# Patient Record
Sex: FEMALE | Race: WHITE | NOT HISPANIC OR LATINO | Employment: PART TIME | URBAN - METROPOLITAN AREA
[De-identification: names, ages, dates, MRNs, and addresses within clinical notes are randomized per-mention and may not be internally consistent; named-entity substitution may affect disease eponyms.]

---

## 2017-03-31 ENCOUNTER — HOSPITAL ENCOUNTER (EMERGENCY)
Facility: HOSPITAL | Age: 27
Discharge: HOME | End: 2017-03-31
Attending: EMERGENCY MEDICINE | Admitting: EMERGENCY MEDICINE
Payer: COMMERCIAL

## 2017-03-31 VITALS
SYSTOLIC BLOOD PRESSURE: 123 MMHG | WEIGHT: 156.53 LBS | TEMPERATURE: 98 F | OXYGEN SATURATION: 97 % | HEART RATE: 114 BPM | RESPIRATION RATE: 16 BRPM | DIASTOLIC BLOOD PRESSURE: 64 MMHG

## 2017-03-31 DIAGNOSIS — R11.2 NAUSEA AND VOMITING, INTRACTABILITY OF VOMITING NOT SPECIFIED, UNSPECIFIED VOMITING TYPE: Primary | ICD-10-CM

## 2017-03-31 PROCEDURE — 96374 THER/PROPH/DIAG INJ IV PUSH: CPT

## 2017-03-31 PROCEDURE — 96375 TX/PRO/DX INJ NEW DRUG ADDON: CPT

## 2017-03-31 PROCEDURE — 96361 HYDRATE IV INFUSION ADD-ON: CPT

## 2017-03-31 PROCEDURE — 99283 EMERGENCY DEPT VISIT LOW MDM: CPT

## 2017-03-31 RX ORDER — METOCLOPRAMIDE HYDROCHLORIDE 5 MG/ML
10 INJECTION INTRAMUSCULAR; INTRAVENOUS ONCE
Status: COMPLETED | OUTPATIENT
Start: 2017-03-31 | End: 2017-03-31

## 2017-03-31 RX ORDER — DIPHENHYDRAMINE HYDROCHLORIDE 50 MG/ML
25 INJECTION INTRAMUSCULAR; INTRAVENOUS EVERY 6 HOURS PRN
Status: DISCONTINUED | OUTPATIENT
Start: 2017-03-31 | End: 2017-03-31 | Stop reason: HOSPADM

## 2017-03-31 RX ORDER — ONDANSETRON 4 MG/1
8 TABLET, ORALLY DISINTEGRATING ORAL EVERY 8 HOURS PRN
Qty: 10 TABLET | Refills: 0 | Status: SHIPPED | OUTPATIENT
Start: 2017-03-31

## 2017-03-31 RX ADMIN — SODIUM CHLORIDE 1000 ML: 0.9 INJECTION, SOLUTION INTRAVENOUS at 09:42

## 2017-03-31 RX ADMIN — METOCLOPRAMIDE 10 MG: 5 INJECTION, SOLUTION INTRAMUSCULAR; INTRAVENOUS at 09:42

## 2017-03-31 RX ADMIN — DIPHENHYDRAMINE HYDROCHLORIDE: 50 INJECTION, SOLUTION INTRAMUSCULAR; INTRAVENOUS at 09:42

## 2018-05-30 ENCOUNTER — HOSPITAL ENCOUNTER (EMERGENCY)
Facility: HOSPITAL | Age: 28
Discharge: HOME | End: 2018-05-30
Attending: EMERGENCY MEDICINE | Admitting: EMERGENCY MEDICINE
Payer: COMMERCIAL

## 2018-05-30 VITALS
RESPIRATION RATE: 18 BRPM | SYSTOLIC BLOOD PRESSURE: 138 MMHG | HEART RATE: 106 BPM | DIASTOLIC BLOOD PRESSURE: 96 MMHG | HEIGHT: 62 IN | TEMPERATURE: 98.2 F | WEIGHT: 179.4 LBS | OXYGEN SATURATION: 95 % | BODY MASS INDEX: 33.01 KG/M2

## 2018-05-30 DIAGNOSIS — K08.89 PAIN, DENTAL: Primary | ICD-10-CM

## 2018-05-30 PROCEDURE — 99282 EMERGENCY DEPT VISIT SF MDM: CPT

## 2018-05-30 RX ORDER — CLINDAMYCIN HYDROCHLORIDE 150 MG/1
300 CAPSULE ORAL EVERY 6 HOURS
Qty: 56 CAPSULE | Refills: 0 | Status: SHIPPED | OUTPATIENT
Start: 2018-05-30 | End: 2018-06-06

## 2018-05-30 RX ORDER — OXYCODONE HYDROCHLORIDE AND ACETAMINOPHEN 5; 325 MG/1; MG/1
1 TABLET ORAL EVERY 4 HOURS PRN
Qty: 28 TABLET | Refills: 0 | Status: SHIPPED | OUTPATIENT
Start: 2018-05-30 | End: 2018-06-02

## 2018-05-30 RX ORDER — OXYCODONE HYDROCHLORIDE AND ACETAMINOPHEN 5; 325 MG/1; MG/1
1 TABLET ORAL ONCE
Status: COMPLETED | OUTPATIENT
Start: 2018-05-30 | End: 2018-05-30

## 2018-05-30 RX ORDER — CLINDAMYCIN HYDROCHLORIDE 150 MG/1
300 CAPSULE ORAL ONCE
Status: COMPLETED | OUTPATIENT
Start: 2018-05-30 | End: 2018-05-30

## 2018-05-30 RX ADMIN — CLINDAMYCIN HYDROCHLORIDE 300 MG: 150 CAPSULE ORAL at 22:52

## 2018-05-30 RX ADMIN — OXYCODONE HYDROCHLORIDE AND ACETAMINOPHEN 1 TABLET: 5; 325 TABLET ORAL at 22:52

## 2018-05-31 NOTE — ED PROVIDER NOTES
History  Chief Complaint   Patient presents with    Dental Pain     Cavities causing tooth pain  She reports throwing up from the pain  Has not made an appointment because of insurance issues  Patient presents to the emergency department with chronic tick dental pain but with flare for the last 3 or 4 days  Her greatest location is in the right upper teeth  She denies fever chills or rash  Denies facial swelling  Denies sore throat or swallowing difficulty  She has been having trouble getting into a dentist secondary to the type of insurance she has  She denies trauma  Prior to Admission Medications   Prescriptions Last Dose Informant Patient Reported? Taking?   ondansetron (ZOFRAN ODT) 4 mg disintegrating tablet   No No   Sig: Take 2 tablets by mouth every 8 (eight) hours as needed for nausea or vomiting for up to 10 doses      Facility-Administered Medications: None       Past Medical History:   Diagnosis Date    GERD (gastroesophageal reflux disease)        Past Surgical History:   Procedure Laterality Date    TOOTH EXTRACTION         History reviewed  No pertinent family history  I have reviewed and agree with the history as documented  Social History   Substance Use Topics    Smoking status: Current Every Day Smoker     Types: Cigarettes    Smokeless tobacco: Never Used    Alcohol use No        Review of Systems   Constitutional: Negative  Negative for activity change, appetite change, chills, diaphoresis, fatigue and fever  HENT: Positive for dental problem  Negative for facial swelling  Eyes: Negative  Respiratory: Negative  Cardiovascular: Negative  Gastrointestinal: Negative  Endocrine: Negative  Genitourinary: Negative  Musculoskeletal: Negative  Negative for neck pain and neck stiffness  Skin: Negative  Allergic/Immunologic: Negative  Neurological: Negative  Negative for dizziness, light-headedness and headaches  Hematological: Negative  Does not bruise/bleed easily  Psychiatric/Behavioral: Negative  Physical Exam  Physical Exam   Constitutional: She is oriented to person, place, and time  She appears well-developed and well-nourished  She appears distressed  Nontoxic without respiratory distress but visibly uncomfortable  No drooling or hot potato voice  Patient can answer simple questions appropriately  HENT:   Head: Normocephalic and atraumatic  Mouth/Throat: Oropharynx is clear and moist  No oropharyngeal exudate  No trismus or submandibular tenderness  No exudative tonsils  No uvular deviation  No drooling  Right upper molars tender to palpation with a tongue blade  No gingival swelling or foul odor   Eyes: EOM are normal  Pupils are equal, round, and reactive to light  Neck: Normal range of motion  Neck supple  Pulmonary/Chest: Effort normal and breath sounds normal  No respiratory distress  She has no wheezes  She has no rales  She exhibits no tenderness  Musculoskeletal: Normal range of motion  She exhibits no edema, tenderness or deformity  Neurological: She is alert and oriented to person, place, and time  She displays normal reflexes  No cranial nerve deficit or sensory deficit  She exhibits normal muscle tone  Coordination normal    Skin: Skin is warm and dry  No rash noted  She is not diaphoretic  Psychiatric: She has a normal mood and affect  Her behavior is normal  Judgment and thought content normal    Nursing note and vitals reviewed        Vital Signs  ED Triage Vitals [05/30/18 2133]   Temperature Pulse Respirations Blood Pressure SpO2   98 2 °F (36 8 °C) (!) 106 18 138/96 95 %      Temp Source Heart Rate Source Patient Position - Orthostatic VS BP Location FiO2 (%)   Oral Monitor Sitting Left arm --      Pain Score       Worst Possible Pain           Vitals:    05/30/18 2133   BP: 138/96   Pulse: (!) 106   Patient Position - Orthostatic VS: Sitting       Visual Acuity      ED Medications  Medications   clindamycin (CLEOCIN) capsule 300 mg (not administered)   oxyCODONE-acetaminophen (PERCOCET) 5-325 mg per tablet 1 tablet (not administered)       Diagnostic Studies  Results Reviewed     None                 No orders to display              Procedures  Procedures       Phone Contacts  ED Phone Contact    ED Course  ED Course as of May 30 2252   Wed May 30, 2018   2248 Patient is stable for discharge  Percocet clindamycin will order with referral to the dental clinic  I discussed signs and symptoms that would require the patient return to the emergency department  [facility]  CritCare Time    Disposition  Final diagnoses:   Pain, dental     Time reflects when diagnosis was documented in both MDM as applicable and the Disposition within this note     Time User Action Codes Description Comment    5/30/2018 10:48 PM Jordyn Sadler [K08 [id number]] Pain, dental       ED Disposition     ED Disposition Condition Comment    Discharge  Krystina Olvera discharge to home/self care      Condition at discharge: Stable        Follow-up Information     Follow up With Specialties Details Why Contact Info    Jenny Charles DMD [facility] Schedule an appointment as soon as possible for a visit  [facility] [id number]  [phone]            Patient's Medications   Discharge Prescriptions    CLINDAMYCIN (CLEOCIN) 150 MG CAPSULE    Take 2 capsules (300 mg total) by mouth every 6 (six) hours for 7 days       Start Date: 5/30/2018 End Date: 6/6/2018       Order Dose: 300 mg       Quantity: 56 capsule    Refills: 0    OXYCODONE-ACETAMINOPHEN (PERCOCET) 5-325 MG PER TABLET    Take 1 tablet by mouth every 4 (four) hours as needed for moderate pain for up to 3 days Max Daily Amount: 6 tablets       Start Date: 5/30/2018 End Date: 6/2/2018       Order Dose: 1 tablet       Quantity: 28 tablet    Refills: 0     No discharge procedures on file     ED Provider  Electronically Signed by           Libia Rich MD  05/30/18 0644

## 2018-05-31 NOTE — DISCHARGE INSTRUCTIONS
Toothache   WHAT YOU NEED TO KNOW:   A toothache is pain that is caused by irritation of the nerves in the center of your tooth  The irritation may be caused by several problems, such as a cavity, an infection, a cracked tooth, or gum disease  It is very important to follow up with your dentist so the cause of your toothache can be diagnosed and treated  This can help prevent more serious problems  DISCHARGE INSTRUCTIONS:   Medicines: You may  need any of the following:  · NSAIDs  decrease swelling and pain  This medicine can be bought with or without a doctor's order  This medicine can cause stomach bleeding or kidney problems in certain people  If you take blood thinner medicine, always ask your healthcare provider if NSAIDs are safe for you  Always read the medicine label and follow the directions on it before using this medicine  · Acetaminophen  decreases pain  It is available without a doctor's order  Ask how much to take and how often to take it  Follow directions  Acetaminophen can cause liver damage if not taken correctly  · Pain medicine  may be given as a pill or as medicine that you put directly on your tooth or gums  Do not wait until the pain is severe before you take this medicine  · Antibiotics  help fight or prevent an infection caused by bacteria  Take them as directed  · Take your medicine as directed  Contact your healthcare provider if you think your medicine is not helping or if you have side effects  Tell him of her if you are allergic to any medicine  Keep a list of the medicines, vitamins, and herbs you take  Include the amounts, and when and why you take them  Bring the list or the pill bottles to follow-up visits  Carry your medicine list with you in case of an emergency  Follow up with your dentist as directed: You may be referred to a dental surgeon  Write down your questions so you remember to ask them during your visits     Self-care:   · Rinse your mouth with warm salt water 4 times a day or as directed  · You may need to eat soft foods to help relieve pain caused by chewing  Contact your dentist if:   · You have questions or concerns about your condition or care  Return to the emergency department if:   · You have trouble breathing  · You have swelling in your face or neck  · You have a fever and chills  · You have trouble speaking or swallowing  · You have trouble opening or closing your mouth  © 2017 [facility] Information is for End User's use only and may not be sold, redistributed or otherwise used for commercial purposes  All illustrations and images included in CareNotes® are the copyrighted property of [employer] M [employer]  or Howard Roberts  The above information is an  only  It is not intended as medical advice for individual conditions or treatments  Talk to your doctor, nurse or pharmacist before following any medical regimen to see if it is safe and effective for you

## 2018-11-06 ENCOUNTER — HOSPITAL ENCOUNTER (EMERGENCY)
Facility: HOSPITAL | Age: 28
Discharge: HOME | End: 2018-11-06
Attending: EMERGENCY MEDICINE
Payer: COMMERCIAL

## 2018-11-06 VITALS
TEMPERATURE: 98.5 F | HEIGHT: 62 IN | WEIGHT: 170 LBS | BODY MASS INDEX: 31.28 KG/M2 | HEART RATE: 98 BPM | RESPIRATION RATE: 18 BRPM | DIASTOLIC BLOOD PRESSURE: 74 MMHG | OXYGEN SATURATION: 96 % | SYSTOLIC BLOOD PRESSURE: 138 MMHG

## 2018-11-06 DIAGNOSIS — L03.011 PARONYCHIA OF FINGER, RIGHT: Primary | ICD-10-CM

## 2018-11-06 DIAGNOSIS — T14.8XXA FOREIGN BODY IN SKIN: ICD-10-CM

## 2018-11-06 PROCEDURE — 99283 EMERGENCY DEPT VISIT LOW MDM: CPT

## 2018-11-06 RX ORDER — SULFAMETHOXAZOLE AND TRIMETHOPRIM 800; 160 MG/1; MG/1
1 TABLET ORAL 2 TIMES DAILY
Qty: 14 TABLET | Refills: 0 | Status: SHIPPED | OUTPATIENT
Start: 2018-11-06 | End: 2018-11-13

## 2018-11-06 RX ORDER — CEPHALEXIN 250 MG/1
500 CAPSULE ORAL ONCE
Status: COMPLETED | OUTPATIENT
Start: 2018-11-06 | End: 2018-11-06

## 2018-11-06 RX ORDER — IBUPROFEN 600 MG/1
600 TABLET ORAL EVERY 6 HOURS PRN
Qty: 20 TABLET | Refills: 0 | Status: SHIPPED | OUTPATIENT
Start: 2018-11-06

## 2018-11-06 RX ORDER — BACITRACIN, NEOMYCIN, POLYMYXIN B 400; 3.5; 5 [USP'U]/G; MG/G; [USP'U]/G
1 OINTMENT TOPICAL ONCE
Status: COMPLETED | OUTPATIENT
Start: 2018-11-06 | End: 2018-11-06

## 2018-11-06 RX ORDER — LIDOCAINE HYDROCHLORIDE 10 MG/ML
5 INJECTION, SOLUTION EPIDURAL; INFILTRATION; INTRACAUDAL; PERINEURAL ONCE
Status: COMPLETED | OUTPATIENT
Start: 2018-11-06 | End: 2018-11-06

## 2018-11-06 RX ORDER — SULFAMETHOXAZOLE AND TRIMETHOPRIM 800; 160 MG/1; MG/1
1 TABLET ORAL ONCE
Status: COMPLETED | OUTPATIENT
Start: 2018-11-06 | End: 2018-11-06

## 2018-11-06 RX ORDER — CEPHALEXIN 500 MG/1
500 CAPSULE ORAL 4 TIMES DAILY
Qty: 28 CAPSULE | Refills: 0 | Status: SHIPPED | OUTPATIENT
Start: 2018-11-06 | End: 2018-11-13

## 2018-11-06 RX ADMIN — BACITRACIN, NEOMYCIN, POLYMYXIN B 1 SMALL APPLICATION: 400; 3.5; 5 OINTMENT TOPICAL at 19:55

## 2018-11-06 RX ADMIN — LIDOCAINE HYDROCHLORIDE 5 ML: 10 INJECTION, SOLUTION EPIDURAL; INFILTRATION; INTRACAUDAL; PERINEURAL at 19:54

## 2018-11-06 RX ADMIN — SULFAMETHOXAZOLE AND TRIMETHOPRIM 1 TABLET: 800; 160 TABLET ORAL at 19:53

## 2018-11-06 RX ADMIN — CEPHALEXIN 500 MG: 250 CAPSULE ORAL at 19:53

## 2018-11-07 NOTE — DISCHARGE INSTRUCTIONS
Paronychia   WHAT YOU NEED TO KNOW:   Paronychia is an infection of your nail fold caused by bacteria or a fungus  The nail fold is the skin around your nail  Paronychia may happen suddenly and last for 6 weeks or longer  You may have paronychia on more than 1 finger or toe  DISCHARGE INSTRUCTIONS:   Medicines:   · Td vaccine  is a booster shot used to help prevent tetanus and diphtheria  The Td booster may be given to adolescents and adults every 10 years or for certain wounds and injuries  · Antibiotics: This medicine will help fight or prevent an infection  It may be given as a pill, cream, or ointment  · Steroids: This medicine will help decrease inflammation  It may be given as a pill, cream, or ointment  · Antifungal medicine: This medicine helps kill fungus that may be causing your infection  It may be given as a cream or ointment  · NSAIDs:  These medicines decrease pain and swelling  NSAIDs are available without a doctor's order  Ask your healthcare provider which medicine is right for you  Ask how much to take and when to take it  Take as directed  NSAIDs can cause stomach bleeding and kidney problems if not taken correctly  · Take your medicine as directed  Contact your healthcare provider if you think your medicine is not helping or if you have side effects  Tell him of her if you are allergic to any medicine  Keep a list of the medicines, vitamins, and herbs you take  Include the amounts, and when and why you take them  Bring the list or the pill bottles to follow-up visits  Carry your medicine list with you in case of an emergency  Follow up with your healthcare provider as directed:  Write down your questions so you remember to ask them during your visits  Self-care:   · Soak your nail:  Soak your nail in a mixture of equal parts vinegar and water 3 or 4 times each day  This will help decrease inflammation      · Apply a warm compress:  Soak a washcloth in warm water and place it on your nail  This will help decrease inflammation  · Elevate:  Raise your nail above the level of your heart as often as you can  This will help decrease swelling and pain  Prop your nail on pillows or blankets to keep it elevated comfortably  · Use lotion:  Apply lotion after you wash your hands  This will prevent your skin from becoming too dry  Prevent paronychia:   · Avoid chemicals and allergens that may harm your skin and nails  This includes soaps, laundry detergents, and nail products  · Keep your nails clean and dry  Avoid soaking your nails in water  Use cotton-lined rubber gloves or wear 2 rubber gloves if you work with food or water  The gloves will help protect your nail folds  · Keep your nails short  Do not bite your nails, pick at your hangnails, suck your fingers, or wear fake nails  Bring your own nail tools when you go to the nail salon  Contact your healthcare provider if:   · Your nail becomes loose, deformed, or falls off  · You have a large abscess on your nail  · You have questions or concerns about your condition or care  Return to the emergency department if:   · You have severe nail pain  · The inflammation spreads to your hand or arm  © 2017 [facility] Information is for End User's use only and may not be sold, redistributed or otherwise used for commercial purposes  All illustrations and images included in CareNotes® are the copyrighted property of [employer]  or Howard Roberts  The above information is an  only  It is not intended as medical advice for individual conditions or treatments  Talk to your doctor, nurse or pharmacist before following any medical regimen to see if it is safe and effective for you  Cellulitis, Ambulatory Care   GENERAL INFORMATION:   Cellulitis  is a skin infection caused by bacteria         Common symptoms include the following:   · Fever    · A red, warm, swollen area on your skin    · Pain when the area is touched    · Bumps or blisters (abscess) that may drain pus    · Bumpy, raised skin that feels like an orange peel  Seek immediate care for the following symptoms:   · An increase in pain, redness, warmth, and size    · Red streaks coming from the infected area    · A thin, gray-brown discharge coming from your infected skin area    · A crackling under your skin when you touch it    · Purple dots or bumps on your skin, or bleeding under your skin    · New swelling and pain in your legs    · Sudden trouble breathing or chest pain  Treatment for cellulitis  may include medicines to treat the bacterial infection or decrease pain  The infection may need to be cleaned out  Damaged, dead, or infected tissue may need to be cut away to help your wound heal   Manage your symptoms:   · Elevate your wound above the level of your heart  as often as you can  This will help decrease swelling and pain  Prop your wound on pillows or blankets to keep it elevated comfortably  · Clean your wound as directed  You may need to wash the wound with soap and water  Look for signs of infection  · Wear pressure stockings as directed  The stockings are tight and put pressure on your legs  This improves blood flow and decreases swelling  Prevent cellulitis:   · Wash your hands often  Use soap and water  Wash your hands after you use the bathroom, change a child's diapers, or sneeze  Wash your hands before you prepare or eat food  Use lotion to prevent dry, cracked skin  · Do not share personal items, such as towels, clothing, and razors  · Clean exercise equipment  with germ-killing  before and after you use it  Follow up with your healthcare provider as directed:  Write down your questions so you remember to ask them during your visits  CARE AGREEMENT:   You have the right to help plan your care  Learn about your health condition and how it may be treated   Discuss treatment options with your caregivers to decide what care you want to receive  You always have the right to refuse treatment  The above information is an  only  It is not intended as medical advice for individual conditions or treatments  Talk to your doctor, nurse or pharmacist before following any medical regimen to see if it is safe and effective for you  © 2014 [address] is for End User's use only and may not be sold, redistributed or otherwise used for commercial purposes  All illustrations and images included in CareNotes® are the copyrighted property of [employer] A M [employer] Inc  or Howard Roberts

## 2018-11-07 NOTE — ED PROVIDER NOTES
History  Chief Complaint   Patient presents with    Foreign Body in Skin     Patient c/o splinter in right middle finger for about two weeks  C/o R 3rd finger swelling/pain/redness by the edge of the nail over the past 2 weeks after she got a splinter in that area  She is not sure if there still is a foreign body/splinter there  No fevers, no n/v            Prior to Admission Medications   Prescriptions Last Dose Informant Patient Reported? Taking?   ondansetron (ZOFRAN ODT) 4 mg disintegrating tablet   No No   Sig: Take 2 tablets by mouth every 8 (eight) hours as needed for nausea or vomiting for up to 10 doses      Facility-Administered Medications: None       Past Medical History:   Diagnosis Date    GERD (gastroesophageal reflux disease)        Past Surgical History:   Procedure Laterality Date    TOOTH EXTRACTION         History reviewed  No pertinent family history  I have reviewed and agree with the history as documented  Social History   Substance Use Topics    Smoking status: Current Every Day Smoker     Types: Cigarettes    Smokeless tobacco: Current User    Alcohol use No        Review of Systems   Constitutional: Negative for fever  Respiratory: Negative for shortness of breath  Cardiovascular: Negative for chest pain  Gastrointestinal: Negative for abdominal pain  Skin: Positive for wound  Neurological: Negative for weakness  Physical Exam  Physical Exam   Constitutional: She is oriented to person, place, and time  She appears well-developed and well-nourished  HENT:   Head: Normocephalic and atraumatic  Pulmonary/Chest: Breath sounds normal    Musculoskeletal: Normal range of motion  Neurological: She is alert and oriented to person, place, and time  Skin:   R 3rd finger,lateral nail edge with swelling/redness/tenderness, good cap refill, +n/v intact, FROM   Nursing note and vitals reviewed        Vital Signs  ED Triage Vitals [11/06/18 1828]   Temperature Pulse Respirations Blood Pressure SpO2   98 5 °F (36 9 °C) 98 18 138/74 96 %      Temp Source Heart Rate Source Patient Position - Orthostatic VS BP Location FiO2 (%)   Oral Monitor Sitting Left arm --      Pain Score       --           Vitals:    11/06/18 1828   BP: 138/74   Pulse: 98   Patient Position - Orthostatic VS: Sitting       Visual Acuity      ED Medications  Medications   lidocaine (PF) (XYLOCAINE-MPF) 1 % injection 5 mL (5 mL Infiltration Given 11/6/18 1954)   cephalexin (KEFLEX) capsule 500 mg (500 mg Oral Given 11/6/18 1953)   sulfamethoxazole-trimethoprim (BACTRIM DS) 800-160 mg per tablet 1 tablet (1 tablet Oral Given 11/6/18 1953)   neomycin-bacitracin-polymyxin b (NEOSPORIN) ointment 1 small application (1 small application Topical Given 11/6/18 1955)       Diagnostic Studies  Results Reviewed     None                 No orders to display              Procedures  Incision/Drainage  Date/Time: 11/6/2018 7:35 PM  Performed by: TORI Winters  Authorized by: TORI Wniters     Patient location:  ED  Consent:     Consent obtained:  Verbal    Consent given by:  Patient    Risks discussed:  Bleeding, incomplete drainage, pain and infection    Alternatives discussed:  No treatment and alternative treatment  Universal protocol:     Patient identity confirmed:  Verbally with patient and arm band  Location:     Type:  Abscess (paronychia)    Size:  1cm    Location: R 3rd finger, lateral nail edge  Pre-procedure details:     Skin preparation:  Betadine  Anesthesia (see MAR for exact dosages):      Anesthesia method:  Local infiltration    Local anesthetic:  Lidocaine 1% w/o epi (2mL)  Procedure details:     Complexity:  Simple    Incision types:  Stab incision    Scalpel blade:  11    Approach:  Open    Incision depth:  Subcutaneous and skin    Wound management:  Probed and deloculated    Drainage:  Bloody    Drainage amount:  Scant    Wound treatment:  Wound left open    Packing materials:  None  Post-procedure details:     Patient tolerance of procedure: Tolerated well, no immediate complications  Comments:      Placed neosporin and dressing           Phone Contacts  ED Phone Contact    ED Course                               MDM  CritCare Time    Disposition  Final diagnoses:   Paronychia of finger, right   Foreign body in skin     Time reflects when diagnosis was documented in both MDM as applicable and the Disposition within this note     Time User Action Codes Description Comment    11/6/2018  7:41 PM Penny Capone R Add [L03 011] Paronychia of finger, right     11/6/2018  7:42 PM Penny Capone R Add [T14  8XXA] Foreign body in skin       ED Disposition     ED Disposition Condition Comment    Discharge  Bearl Neighbor discharge to home/self care      Condition at discharge: Stable        Follow-up Information     Follow up With Specialties Details Why Contact Info Additional [phone] Family Medicine   [address]  [facility] [id number]  [address], [address] [place], [place], [address]          Discharge Medication List as of 11/6/2018  7:44 PM      START taking these medications    Details   cephalexin (KEFLEX) 500 mg capsule Take 1 capsule (500 mg total) by mouth 4 (four) times a day for 7 days, Starting Tue 11/6/2018, Until Tue 11/13/2018, Print      ibuprofen (MOTRIN) 600 mg tablet Take 1 tablet (600 mg total) by mouth every 6 (six) hours as needed for mild pain, Starting Tue 11/6/2018, Print      sulfamethoxazole-trimethoprim (BACTRIM DS) 800-160 mg per tablet Take 1 tablet by mouth 2 (two) times a day for 7 days smx-tmp DS (BACTRIM) 800-160 mg tabs (1tab q12 D10), Starting Tue 11/6/2018, Until Tue 11/13/2018, Print         CONTINUE these medications which have NOT CHANGED    Details   ondansetron (ZOFRAN ODT) 4 mg disintegrating tablet Take 2 tablets by mouth every 8 (eight) hours as needed for nausea or vomiting for up to 10 doses, Starting 3/31/2017, Until Discontinued, Print           No discharge procedures on file      ED Provider  Electronically Signed by           Hugo Sharif MD  11/06/18 2832

## 2020-01-30 ENCOUNTER — HOSPITAL ENCOUNTER (EMERGENCY)
Facility: HOSPITAL | Age: 30
Discharge: HOME | End: 2020-01-30
Attending: EMERGENCY MEDICINE | Admitting: EMERGENCY MEDICINE
Payer: COMMERCIAL

## 2020-01-30 VITALS
DIASTOLIC BLOOD PRESSURE: 78 MMHG | RESPIRATION RATE: 18 BRPM | OXYGEN SATURATION: 97 % | WEIGHT: 160 LBS | BODY MASS INDEX: 29.26 KG/M2 | HEART RATE: 96 BPM | SYSTOLIC BLOOD PRESSURE: 136 MMHG | TEMPERATURE: 98.5 F

## 2020-01-30 DIAGNOSIS — L03.012 PARONYCHIA OF LEFT MIDDLE FINGER: Primary | ICD-10-CM

## 2020-01-30 PROCEDURE — 99283 EMERGENCY DEPT VISIT LOW MDM: CPT

## 2020-01-30 PROCEDURE — 99284 EMERGENCY DEPT VISIT MOD MDM: CPT | Performed by: PHYSICIAN ASSISTANT

## 2020-01-30 RX ORDER — CEPHALEXIN 500 MG/1
500 CAPSULE ORAL 4 TIMES DAILY
Qty: 40 CAPSULE | Refills: 0 | Status: SHIPPED | OUTPATIENT
Start: 2020-01-30 | End: 2020-02-09

## 2020-01-30 NOTE — ED PROVIDER NOTES
History  Chief Complaint   Patient presents with    Finger Injury     pt states "her finger is infected from having a hair splinter stuck in her finger"  66-year-old female presents the emergency department with complaints left-sided middle finger pain  States she has had some pain along the side of the nail for the past 2 weeks  No drainage from the area  Small amount of redness  States she has history of previous infection along the nail bed which required drainage  She does not bite her nails  Works as a   States that she did have a manicure 2 weeks ago but that this problem started prior to this  Has been soaking the finger in warm salt water without relief of symptoms  History provided by:  Patient   used: No        Prior to Admission Medications   Prescriptions Last Dose Informant Patient Reported? Taking?   ibuprofen (MOTRIN) 600 mg tablet   No No   Sig: Take 1 tablet (600 mg total) by mouth every 6 (six) hours as needed for mild pain   ondansetron (ZOFRAN ODT) 4 mg disintegrating tablet   No No   Sig: Take 2 tablets by mouth every 8 (eight) hours as needed for nausea or vomiting for up to 10 doses      Facility-Administered Medications: None       Past Medical History:   Diagnosis Date    GERD (gastroesophageal reflux disease)        Past Surgical History:   Procedure Laterality Date    TOOTH EXTRACTION         History reviewed  No pertinent family history  I have reviewed and agree with the history as documented  Social History     Tobacco Use    Smoking status: Current Every Day Smoker     Types: Cigarettes    Smokeless tobacco: Current User   Substance Use Topics    Alcohol use: No    Drug use: No        Review of Systems   Constitutional: Negative for chills and fever  HENT: Negative for congestion, dental problem and sore throat  Respiratory: Negative for cough  Cardiovascular: Negative for chest pain     Gastrointestinal: Negative for abdominal pain  Musculoskeletal: Negative for back pain and neck pain  Finger pain   Skin: Negative for rash and wound  All other systems reviewed and are negative  Physical Exam  Physical Exam   Constitutional: She is oriented to person, place, and time  Vital signs are normal  She appears well-developed and well-nourished  HENT:   Head: Normocephalic and atraumatic  Cardiovascular: Normal rate and regular rhythm  Pulmonary/Chest: Effort normal and breath sounds normal  No respiratory distress  She has no wheezes  She has no rhonchi  She has no rales  Musculoskeletal:        Hands:  Neurological: She is alert and oriented to person, place, and time  Skin: Skin is warm and dry  Psychiatric: She has a normal mood and affect  Her behavior is normal    Nursing note and vitals reviewed  Vital Signs  ED Triage Vitals [01/30/20 0708]   Temperature Pulse Respirations Blood Pressure SpO2   98 5 °F (36 9 °C) 96 18 136/78 97 %      Temp Source Heart Rate Source Patient Position - Orthostatic VS BP Location FiO2 (%)   Oral Monitor -- -- --      Pain Score       --           Vitals:    01/30/20 0708   BP: 136/78   Pulse: 96         Visual Acuity      ED Medications  Medications - No data to display    Diagnostic Studies  Results Reviewed     None                 No orders to display              Procedures  Procedures         ED Course                               MDM  Number of Diagnoses or Management Options  Paronychia of left middle finger:   Diagnosis management comments: Differential diagnosis includes but not limited to:  Paronychia      18 gauge needle used along the nail plate to aspirate small area of purulent material   Patient tolerated well          Disposition  Final diagnoses:   Paronychia of left middle finger     Time reflects when diagnosis was documented in both MDM as applicable and the Disposition within this note     Time User Action Codes Description Comment    1/30/2020 8:16 AM Ragini Quezada Add [A87 [id number]] Paronychia of left middle finger       ED Disposition     ED Disposition Condition Date/Time Comment    Discharge Stable Thu Jan 30, 2020  8:16 AM Truong Salazar discharge to home/self care  Follow-up Information    None         Patient's Medications   Discharge Prescriptions    CEPHALEXIN (KEFLEX) 500 MG CAPSULE    Take 1 capsule (500 mg total) by mouth 4 (four) times a day for 10 days       Start Date: 1/30/2020 End Date: 2/9/2020       Order Dose: 500 mg       Quantity: 40 capsule    Refills: 0     No discharge procedures on file      ED Provider  Electronically Signed by           Fredy German PA-C  01/30/20 7058

## 2021-02-10 ENCOUNTER — HOSPITAL ENCOUNTER (EMERGENCY)
Facility: HOSPITAL | Age: 31
Discharge: HOME | End: 2021-02-10
Attending: EMERGENCY MEDICINE | Admitting: EMERGENCY MEDICINE
Payer: COMMERCIAL

## 2021-02-10 ENCOUNTER — APPOINTMENT (EMERGENCY)
Dept: RADIOLOGY | Facility: HOSPITAL | Age: 31
End: 2021-02-10
Payer: COMMERCIAL

## 2021-02-10 VITALS
DIASTOLIC BLOOD PRESSURE: 67 MMHG | TEMPERATURE: 97.6 F | WEIGHT: 160 LBS | RESPIRATION RATE: 18 BRPM | OXYGEN SATURATION: 97 % | SYSTOLIC BLOOD PRESSURE: 137 MMHG | BODY MASS INDEX: 29.26 KG/M2 | HEART RATE: 60 BPM
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
## 2021-02-10 DIAGNOSIS — S80.01XA CONTUSION OF RIGHT KNEE, INITIAL ENCOUNTER: Primary | ICD-10-CM

## 2021-02-10 PROCEDURE — 73564 X-RAY EXAM KNEE 4 OR MORE: CPT

## 2021-02-10 PROCEDURE — 99283 EMERGENCY DEPT VISIT LOW MDM: CPT

## 2021-02-10 PROCEDURE — 99282 EMERGENCY DEPT VISIT SF MDM: CPT | Performed by: EMERGENCY MEDICINE

## 2021-02-10 NOTE — DISCHARGE INSTRUCTIONS
Diagnosis; fall/ right knee contusion     -  activity as tolerated     - for pain- over the counter tylenol 500 mg every 4 hrs     - if after 1 week - still having pain- decreased range of motion- please call   the orthopedic doctor to  schedule an appointment - you can see anyone in t he group

## 2021-02-12 NOTE — ED PROVIDER NOTES
History  Chief Complaint   Patient presents with    Knee Injury     reports slipping on a puddle on saturday injuring R knee  reports persistent pain and swelling to knee  26 yr female slipped on puddle in store over last weekend with direct trauma of right  ant/medial knee against floor- no rotational injury prior- c/o ant/ medal knee pain -- no other comps or injuries       History provided by:  Patient   used: No        Prior to Admission Medications   Prescriptions Last Dose Informant Patient Reported? Taking?   ibuprofen (MOTRIN) 600 mg tablet   No No   Sig: Take 1 tablet (600 mg total) by mouth every 6 (six) hours as needed for mild pain   ondansetron (ZOFRAN ODT) 4 mg disintegrating tablet   No No   Sig: Take 2 tablets by mouth every 8 (eight) hours as needed for nausea or vomiting for up to 10 doses      Facility-Administered Medications: None       Past Medical History:   Diagnosis Date    GERD (gastroesophageal reflux disease)        Past Surgical History:   Procedure Laterality Date    TOOTH EXTRACTION         History reviewed  No pertinent family history  I have reviewed and agree with the history as documented  E-Cigarette/Vaping    E-Cigarette Use Never User      E-Cigarette/Vaping Substances     Social History     Tobacco Use    Smoking status: Current Every Day Smoker     Packs/day: 0 50     Types: Cigarettes    Smokeless tobacco: Current User   Substance Use Topics    Alcohol use: No    Drug use: No       Review of Systems   Constitutional: Negative  HENT: Negative  Eyes: Negative  Respiratory: Negative  Cardiovascular: Negative  Gastrointestinal: Negative  Endocrine: Negative  Genitourinary: Negative  Musculoskeletal: Negative  R5 knee pain    Skin: Negative  Allergic/Immunologic: Negative  Neurological: Negative  Hematological: Negative  Psychiatric/Behavioral: Negative          Physical Exam  Physical Exam  Vitals signs and nursing note reviewed  Constitutional:       General: She is not in acute distress  Appearance: Normal appearance  She is not ill-appearing, toxic-appearing or diaphoretic  Comments: avss-- pulse ox  97 % on ra- interpretation is normal- no intervention    Musculoskeletal:         General: Tenderness and signs of injury present  No swelling or deformity  Right lower leg: No edema  Left lower leg: No edema  Comments: Knee- pos anterior/medial knee tenderness to palpation / active rom --  Pos patellar tenderness to palpation -- no fib head tenderness to palpation - no lig laxity / no effusion- normal flex/ext at knee- no calf tenderness to palpation - normal achilles tendon function    Neurological:      Mental Status: She is alert  Vital Signs  ED Triage Vitals   Temperature Pulse Respirations Blood Pressure SpO2   02/10/21 1445 02/10/21 1447 02/10/21 1447 02/10/21 1447 02/10/21 1447   97 6 °F (36 4 °C) 60 18 137/67 97 %      Temp src Heart Rate Source Patient Position - Orthostatic VS BP Location FiO2 (%)   -- -- -- -- --             Pain Score       02/10/21 1447       7           Vitals:    02/10/21 1447   BP: 137/67   Pulse: 60         Visual Acuity      ED Medications  Medications - No data to display    Diagnostic Studies  Results Reviewed     None                 XR knee 4+ vw right injury   ED Interpretation by Leonardo Ba MD (02/10 1547)   No fx       Final Result by Seng Harris MD (02/10 1601)      No acute osseous abnormality              Workstation performed: [id number]                    Procedures  Procedures         ED Course  ED Course as of Feb 12 0729   Wed Feb 10, 2021   1547 R knee x ray - no fx                                               MDM    Disposition  Final diagnoses:   Contusion of right knee, initial encounter   Fall, initial encounter     Time reflects when diagnosis was documented in both MDM as applicable and the Disposition within this note     Time User Action Codes Description Comment    2/10/2021  3:48 PM Erwin Sadler [S80 01XA] Contusion of right knee, initial encounter     2/10/2021  3:49 PM Erwin Sadler [E84  Darinel Padilla, initial encounter       ED Disposition     ED Disposition Condition Date/Time Comment    Discharge Stable Wed Feb 10, 2021  3:48 PM Felice Calderon discharge to home/self care  Follow-up Information     Follow up With Specialties Details Why Contact Info    Adolfo Gowers, MD Orthopedic Surgery Call  As needed [address]   [address] [facility] [address]  [phone]            Discharge Medication List as of 2/10/2021  3:51 PM      CONTINUE these medications which have NOT CHANGED    Details   ibuprofen (MOTRIN) 600 mg tablet Take 1 tablet (600 mg total) by mouth every 6 (six) hours as needed for mild pain, Starting Tue 11/6/2018, Print      ondansetron (ZOFRAN ODT) 4 mg disintegrating tablet Take 2 tablets by mouth every 8 (eight) hours as needed for nausea or vomiting for up to 10 doses, Starting 3/31/2017, Until Discontinued, Print           No discharge procedures on file      PDMP Review     None          ED Provider  Electronically Signed by           Toya Chaney MD  02/12/21 8931

## 2022-03-30 ENCOUNTER — HOSPITAL ENCOUNTER (EMERGENCY)
Facility: HOSPITAL | Age: 32
Discharge: HOME | End: 2022-03-30
Attending: EMERGENCY MEDICINE | Admitting: EMERGENCY MEDICINE
Payer: COMMERCIAL

## 2022-03-30 ENCOUNTER — APPOINTMENT (EMERGENCY)
Dept: CT IMAGING | Facility: HOSPITAL | Age: 32
End: 2022-03-30
Payer: COMMERCIAL

## 2022-03-30 ENCOUNTER — APPOINTMENT (EMERGENCY)
Dept: ULTRASOUND IMAGING | Facility: HOSPITAL | Age: 32
End: 2022-03-30
Payer: COMMERCIAL

## 2022-03-30 VITALS
DIASTOLIC BLOOD PRESSURE: 78 MMHG | SYSTOLIC BLOOD PRESSURE: 138 MMHG | HEIGHT: 62 IN | HEART RATE: 64 BPM | OXYGEN SATURATION: 97 % | TEMPERATURE: 97.9 F | BODY MASS INDEX: 29.26 KG/M2 | RESPIRATION RATE: 17 BRPM

## 2022-03-30 DIAGNOSIS — A59.9 TRICHOMONAS INFECTION: Primary | ICD-10-CM

## 2022-03-30 DIAGNOSIS — R11.14 BILIOUS VOMITING WITH NAUSEA: ICD-10-CM

## 2022-03-30 LAB
ALBUMIN SERPL BCP-MCNC: 4 G/DL (ref 3.5–5)
ALP SERPL-CCNC: 67 U/L (ref 46–116)
ALT SERPL W P-5'-P-CCNC: 35 U/L (ref 12–78)
ANION GAP SERPL CALCULATED.3IONS-SCNC: 8 MMOL/L (ref 4–13)
AST SERPL W P-5'-P-CCNC: 22 U/L (ref 5–45)
BACTERIA UR QL AUTO: ABNORMAL /HPF
BASOPHILS # BLD AUTO: 0.05 THOUSANDS/ΜL (ref 0–0.1)
BASOPHILS NFR BLD AUTO: 1 % (ref 0–1)
BILIRUB SERPL-MCNC: 0.3 MG/DL (ref 0.2–1)
BILIRUB UR QL STRIP: NEGATIVE
BUN SERPL-MCNC: 9 MG/DL (ref 5–25)
CALCIUM SERPL-MCNC: 8.7 MG/DL (ref 8.3–10.1)
CHLORIDE SERPL-SCNC: 104 MMOL/L (ref 100–108)
CLARITY UR: CLEAR
CO2 SERPL-SCNC: 24 MMOL/L (ref 21–32)
COLOR UR: YELLOW
CREAT SERPL-MCNC: 1 MG/DL (ref 0.6–1.3)
EOSINOPHIL # BLD AUTO: 0.04 THOUSAND/ΜL (ref 0–0.61)
EOSINOPHIL NFR BLD AUTO: 1 % (ref 0–6)
ERYTHROCYTE [DISTWIDTH] IN BLOOD BY AUTOMATED COUNT: 12.9 % (ref 11.6–15.1)
EXT PREG TEST URINE: NEGATIVE
EXT. CONTROL ED NAV: NORMAL
GFR SERPL CREATININE-BSD FRML MDRD: 73 ML/MIN/1.73SQ M
GLUCOSE SERPL-MCNC: 112 MG/DL (ref 65–140)
GLUCOSE UR STRIP-MCNC: NEGATIVE MG/DL
HCT VFR BLD AUTO: 40.9 % (ref 34.8–46.1)
HGB BLD-MCNC: 13.6 G/DL (ref 11.5–15.4)
HGB UR QL STRIP.AUTO: ABNORMAL
IMM GRANULOCYTES # BLD AUTO: 0.02 THOUSAND/UL (ref 0–0.2)
IMM GRANULOCYTES NFR BLD AUTO: 0 % (ref 0–2)
KETONES UR STRIP-MCNC: NEGATIVE MG/DL
LEUKOCYTE ESTERASE UR QL STRIP: ABNORMAL
LIPASE SERPL-CCNC: 50 U/L (ref 73–393)
LYMPHOCYTES # BLD AUTO: 1.52 THOUSANDS/ΜL (ref 0.6–4.47)
LYMPHOCYTES NFR BLD AUTO: 24 % (ref 14–44)
MCH RBC QN AUTO: 29.6 PG (ref 26.8–34.3)
MCHC RBC AUTO-ENTMCNC: 33.3 G/DL (ref 31.4–37.4)
MCV RBC AUTO: 89 FL (ref 82–98)
MONOCYTES # BLD AUTO: 0.22 THOUSAND/ΜL (ref 0.17–1.22)
MONOCYTES NFR BLD AUTO: 4 % (ref 4–12)
NEUTROPHILS # BLD AUTO: 4.46 THOUSANDS/ΜL (ref 1.85–7.62)
NEUTS SEG NFR BLD AUTO: 70 % (ref 43–75)
NITRITE UR QL STRIP: NEGATIVE
NON-SQ EPI CELLS URNS QL MICRO: ABNORMAL /HPF
NRBC BLD AUTO-RTO: 0 /100 WBCS
OTHER STN SPEC: ABNORMAL
PH UR STRIP.AUTO: 6.5 [PH] (ref 4.5–8)
PLATELET # BLD AUTO: 286 THOUSANDS/UL (ref 149–390)
PMV BLD AUTO: 9 FL (ref 8.9–12.7)
POTASSIUM SERPL-SCNC: 3.9 MMOL/L (ref 3.5–5.3)
PROT SERPL-MCNC: 7.7 G/DL (ref 6.4–8.2)
PROT UR STRIP-MCNC: NEGATIVE MG/DL
RBC # BLD AUTO: 4.59 MILLION/UL (ref 3.81–5.12)
RBC #/AREA URNS AUTO: ABNORMAL /HPF
SODIUM SERPL-SCNC: 136 MMOL/L (ref 136–145)
SP GR UR STRIP.AUTO: 1.01 (ref 1–1.03)
UROBILINOGEN UR QL STRIP.AUTO: 0.2 E.U./DL
WBC # BLD AUTO: 6.31 THOUSAND/UL (ref 4.31–10.16)
WBC #/AREA URNS AUTO: ABNORMAL /HPF

## 2022-03-30 PROCEDURE — G1004 CDSM NDSC: HCPCS

## 2022-03-30 PROCEDURE — 80053 COMPREHEN METABOLIC PANEL: CPT | Performed by: EMERGENCY MEDICINE

## 2022-03-30 PROCEDURE — 99284 EMERGENCY DEPT VISIT MOD MDM: CPT | Performed by: EMERGENCY MEDICINE

## 2022-03-30 PROCEDURE — 74177 CT ABD & PELVIS W/CONTRAST: CPT

## 2022-03-30 PROCEDURE — 85025 COMPLETE CBC W/AUTO DIFF WBC: CPT | Performed by: EMERGENCY MEDICINE

## 2022-03-30 PROCEDURE — 99284 EMERGENCY DEPT VISIT MOD MDM: CPT

## 2022-03-30 PROCEDURE — 76705 ECHO EXAM OF ABDOMEN: CPT

## 2022-03-30 PROCEDURE — 81001 URINALYSIS AUTO W/SCOPE: CPT

## 2022-03-30 PROCEDURE — 81025 URINE PREGNANCY TEST: CPT

## 2022-03-30 PROCEDURE — 36415 COLL VENOUS BLD VENIPUNCTURE: CPT

## 2022-03-30 PROCEDURE — 83690 ASSAY OF LIPASE: CPT | Performed by: EMERGENCY MEDICINE

## 2022-03-30 PROCEDURE — 96365 THER/PROPH/DIAG IV INF INIT: CPT

## 2022-03-30 RX ORDER — ONDANSETRON 4 MG/1
4 TABLET, ORALLY DISINTEGRATING ORAL EVERY 6 HOURS PRN
Qty: 20 TABLET | Refills: 0 | Status: SHIPPED | OUTPATIENT
Start: 2022-03-30

## 2022-03-30 RX ORDER — METRONIDAZOLE 500 MG/1
500 TABLET ORAL EVERY 12 HOURS SCHEDULED
Qty: 14 TABLET | Refills: 0 | Status: SHIPPED | OUTPATIENT
Start: 2022-03-30 | End: 2022-04-06

## 2022-03-30 RX ORDER — ONDANSETRON 4 MG/1
4 TABLET, ORALLY DISINTEGRATING ORAL ONCE
Status: COMPLETED | OUTPATIENT
Start: 2022-03-30 | End: 2022-03-30

## 2022-03-30 RX ADMIN — IOHEXOL 100 ML: 350 INJECTION, SOLUTION INTRAVENOUS at 15:12

## 2022-03-30 RX ADMIN — ONDANSETRON 4 MG: 4 TABLET, ORALLY DISINTEGRATING ORAL at 14:31

## 2022-03-30 RX ADMIN — SODIUM CHLORIDE, SODIUM LACTATE, POTASSIUM CHLORIDE, AND CALCIUM CHLORIDE 1000 ML: .6; .31; .03; .02 INJECTION, SOLUTION INTRAVENOUS at 14:20

## 2022-03-30 NOTE — DISCHARGE INSTRUCTIONS
Please follow-up with your gastroenterology doctor that we provided contact information for and your PCP for continued management of symptoms

## 2022-03-30 NOTE — ED PROVIDER NOTES
History  Chief Complaint   Patient presents with    Abdominal Pain     Patient reports n/v x 3 days, with lower ABD pain and diarrhea  Denies  sx  Pepto without relief  Denies CP/SOB     Maryetta Meigs comes emergency department after 3 days of persistent nausea that began on this past Monday (March 28th)  She states there is no recent trauma at that time were any suspicious food intake  Describes that she typically will use marijuana and vaping in the morning to potentially alleviate symptoms  States that there was no improvement in symptoms on these therapies  She attempted to utilize Pepto-Bismol which gave her temporary relief  She attempt utilizing Pepto-Bismol again this morning (March 30th) but had no improvement in her symptoms  Describes that she will have approximately 3 episodes bilious vomiting within 24 hour periods  Describes that she has used Zofran in the past when she has come to the emergency department which has helped with her symptoms before  She has not taken any Zofran for today symptoms  Describes feeling dizzy/lightheaded when transporting herself to the emergency department  Denies any chest pains, shortness of breath, changes in her urination, changes in her bowel movements, new rashes or discolorations, or lower extremity swelling  She states that she is not sexually active and her last menstrual period was 1 month ago        History provided by:  Patient   used: No    Abdominal Pain  Pain location:  LLQ  Pain quality: bloating and sharp    Pain radiates to:  Does not radiate  Pain severity:  Mild  Onset quality:  Gradual  Duration:  3 days  Timing:  Intermittent  Progression:  Waxing and waning  Chronicity:  New  Context: not alcohol use, not awakening from sleep, not diet changes, not eating, not laxative use, not medication withdrawal, not previous surgeries, not recent illness, not recent sexual activity, not recent travel, not retching, not sick contacts, not suspicious food intake and not trauma    Relieved by:  Nothing  Worsened by:  Nothing  Ineffective treatments:  Antacids (Pepto-Bismol)  Associated symptoms: nausea and vomiting    Associated symptoms: no anorexia, no belching, no chest pain, no chills, no constipation, no cough, no diarrhea, no dysuria, no fatigue, no fever, no flatus, no hematemesis, no hematochezia, no hematuria, no melena, no shortness of breath, no sore throat, no vaginal bleeding and no vaginal discharge    Nausea:     Severity:  Moderate    Onset quality:  Sudden    Duration:  3 days    Timing:  Constant    Progression:  Waxing and waning      Prior to Admission Medications   Prescriptions Last Dose Informant Patient Reported? Taking?   ibuprofen (MOTRIN) 600 mg tablet   No No   Sig: Take 1 tablet (600 mg total) by mouth every 6 (six) hours as needed for mild pain   ondansetron (ZOFRAN ODT) 4 mg disintegrating tablet   No No   Sig: Take 2 tablets by mouth every 8 (eight) hours as needed for nausea or vomiting for up to 10 doses      Facility-Administered Medications: None       Past Medical History:   Diagnosis Date    GERD (gastroesophageal reflux disease)        Past Surgical History:   Procedure Laterality Date    TOOTH EXTRACTION         History reviewed  No pertinent family history  I have reviewed and agree with the history as documented  E-Cigarette/Vaping    E-Cigarette Use Never User      E-Cigarette/Vaping Substances     Social History     Tobacco Use    Smoking status: Current Every Day Smoker     Packs/day: 0 50     Types: Cigarettes    Smokeless tobacco: Current User   Vaping Use    Vaping Use: Never used   Substance Use Topics    Alcohol use: No    Drug use: Yes     Types: Marijuana        Review of Systems   Constitutional: Negative for chills, fatigue and fever  HENT: Negative for ear pain and sore throat  Eyes: Negative for pain and visual disturbance     Respiratory: Negative for cough and shortness of breath  Cardiovascular: Negative for chest pain and palpitations  Gastrointestinal: Positive for abdominal pain, nausea and vomiting  Negative for anorexia, constipation, diarrhea, flatus, hematemesis, hematochezia and melena  Genitourinary: Negative for dysuria, hematuria, vaginal bleeding and vaginal discharge  Musculoskeletal: Negative for arthralgias and back pain  Skin: Negative for color change and rash  Neurological: Negative for seizures and syncope  All other systems reviewed and are negative  Physical Exam  ED Triage Vitals [03/30/22 1238]   Temperature Pulse Respirations Blood Pressure SpO2   97 9 °F (36 6 °C) 80 18 137/65 99 %      Temp Source Heart Rate Source Patient Position - Orthostatic VS BP Location FiO2 (%)   Oral Monitor Sitting Left arm --      Pain Score       6             Orthostatic Vital Signs  Vitals:    03/30/22 1238 03/30/22 1415   BP: 137/65 146/82   Pulse: 80 68   Patient Position - Orthostatic VS: Sitting Lying       Physical Exam  Vitals and nursing note reviewed  Constitutional:       General: She is not in acute distress  Appearance: She is well-developed  HENT:      Head: Normocephalic and atraumatic  Eyes:      Extraocular Movements: Extraocular movements intact  Conjunctiva/sclera: Conjunctivae normal    Cardiovascular:      Rate and Rhythm: Normal rate and regular rhythm  Heart sounds: Normal heart sounds  No murmur heard  Pulmonary:      Effort: Pulmonary effort is normal  No respiratory distress  Breath sounds: Normal breath sounds  Abdominal:      General: Abdomen is flat  Bowel sounds are normal  There are no signs of injury  Palpations: Abdomen is soft  There is no hepatomegaly or splenomegaly  Tenderness: There is abdominal tenderness in the left lower quadrant  There is no right CVA tenderness, left CVA tenderness, guarding or rebound   Negative signs include Brown's sign, Rovsing's sign and McBurney's sign  Hernia: No hernia is present  Musculoskeletal:      Cervical back: Neck supple  Skin:     General: Skin is warm and dry  Capillary Refill: Capillary refill takes less than 2 seconds  Neurological:      General: No focal deficit present  Mental Status: She is alert and oriented to person, place, and time     Psychiatric:         Mood and Affect: Mood normal          ED Medications  Medications   ondansetron (ZOFRAN-ODT) dispersible tablet 4 mg (4 mg Oral Given 3/30/22 1431)   lactated ringers bolus 1,000 mL (0 mL Intravenous Stopped 3/30/22 1520)   iohexol (OMNIPAQUE) 350 MG/ML injection (SINGLE-DOSE) 100 mL (100 mL Intravenous Given 3/30/22 1512)       Diagnostic Studies  Results Reviewed     Procedure Component Value Units Date/Time    Urine Microscopic [[id number]]  (Abnormal) Collected: 03/30/22 1438    Lab Status: Final result Specimen: Urine, Clean Catch Updated: 03/30/22 1503     RBC, UA 0-1 /hpf      WBC, UA 4-10 /hpf      Epithelial Cells Occasional /hpf      Bacteria, UA Occasional /hpf      OTHER OBSERVATIONS Trichomonas Organisms Present    POCT pregnancy, urine [[id number]]  (Normal) Resulted: 03/30/22 1442    Lab Status: Final result Updated: 03/30/22 1442     EXT PREG TEST UR (Ref: Negative) Negative     Control Valid    Urine Macroscopic, POC [[id number]]  (Abnormal) Collected: 03/30/22 1438    Lab Status: Final result Specimen: Urine Updated: 03/30/22 1440     Color, UA Yellow     Clarity, UA Clear     pH, UA 6 5     Leukocytes, UA Small     Nitrite, UA Negative     Protein, UA Negative mg/dl      Glucose, UA Negative mg/dl      Ketones, UA Negative mg/dl      Urobilinogen, UA 0 2 E U /dl      Bilirubin, UA Negative     Blood, UA Small     Specific Prospect, UA 1 010    Narrative:      CLINITEK RESULT    Comprehensive metabolic panel [[id number]] Collected: 03/30/22 1242    Lab Status: Final result Specimen: Blood from Arm, Right Updated: 03/30/22 1310     Sodium 136 mmol/L      Potassium 3 9 mmol/L      Chloride 104 mmol/L      CO2 24 mmol/L      ANION GAP 8 mmol/L      BUN 9 mg/dL      Creatinine 1 00 mg/dL      Glucose 112 mg/dL      Calcium 8 7 mg/dL      AST 22 U/L      ALT 35 U/L      Alkaline Phosphatase 67 U/L      Total Protein 7 7 g/dL      Albumin 4 0 g/dL      Total Bilirubin 0 30 mg/dL      eGFR 73 ml/min/1 73sq m     Narrative:      National Kidney Disease Foundation guidelines for Chronic Kidney Disease (CKD):     Stage 1 with normal or high GFR (GFR > 90 mL/min/1 73 square meters)    Stage 2 Mild CKD (GFR = 60-89 mL/min/1 73 square meters)    Stage 3A Moderate CKD (GFR = 45-59 mL/min/1 73 square meters)    Stage 3B Moderate CKD (GFR = 30-44 mL/min/1 73 square meters)    Stage 4 Severe CKD (GFR = 15-29 mL/min/1 73 square meters)    Stage 5 End Stage CKD (GFR <15 mL/min/1 73 square meters)  Note: GFR calculation is accurate only with a steady state creatinine    Lipase [[id number]]  (Abnormal) Collected: 03/30/22 1242    Lab Status: Final result Specimen: Blood from Arm, Right Updated: 03/30/22 1310     Lipase 50 u/L     CBC and differential [[id number]] Collected: 03/30/22 1242    Lab Status: Final result Specimen: Blood from Arm, Right Updated: 03/30/22 1255     WBC 6 31 Thousand/uL      RBC 4 59 Million/uL      Hemoglobin 13 6 g/dL      Hematocrit 40 9 %      MCV 89 fL      MCH 29 6 pg      MCHC 33 3 g/dL      RDW 12 9 %      MPV 9 0 fL      Platelets 785 Thousands/uL      nRBC 0 /100 WBCs      Neutrophils Relative 70 %      Immat GRANS % 0 %      Lymphocytes Relative 24 %      Monocytes Relative 4 %      Eosinophils Relative 1 %      Basophils Relative 1 %      Neutrophils Absolute 4 46 Thousands/µL      Immature Grans Absolute 0 02 Thousand/uL      Lymphocytes Absolute 1 52 Thousands/µL      Monocytes Absolute 0 22 Thousand/µL      Eosinophils Absolute 0 04 Thousand/µL      Basophils Absolute 0 05 Thousands/µL                   right upper quadrant   ED Interpretation by Shankar Gross MD (03/30 1648)   FINDINGS:     PANCREAS:  Visualized portions of the pancreas are within normal limits  No pancreatic ductal dilatation      AORTA AND IVC:  Visualized portions are normal for patient age      LIVER:  Size:  Mildly enlarged by strict ultrasound criteria     The liver measures 17 1 cm in the midclavicular line  Contour:  Smooth  Parenchyma:  Patchy/heterogeneous hepatic echotexture with preserved visualization of portal venous walls and the diaphragm  A 3 4 cm hypoechoic, round area is demonstrated in the right lobe peripherally, no internal vascularity     Limited imaging of the main portal vein shows it to be patent and hepatopetal      BILIARY:  No gallbladder findings  No intrahepatic biliary dilatation  CBD measures up to 9 mm proximally and 4 mm distally, normal tapering  No filling defects, wall thickening or mass lesions demonstrated  No choledocholithiasis      KIDNEY:   Right kidney measures 9 6 x 5 1 x 4 1 cm  Volume 105 5 mL  Kidney within normal limits      ASCITES:      None      The study was marked in EPIC for immediate notification       IMPRESSION:     Mild extrahepatic bile duct dilatation  No etiology demonstrated      Heterogeneous hepatic echotexture with single masslike area as described above  The differential diagnosis includes heterogeneous fatty deposition versus hepatitis      Both of the above can be evaluated further with MR abdomen to include MRCP      Workstation performed: [id number]      Final Result by Milton Pringle MD (03/30 1646)      Mild extrahepatic bile duct dilatation  No etiology demonstrated  Heterogeneous hepatic echotexture with single masslike area as described above  The differential diagnosis includes heterogeneous fatty deposition versus hepatitis  Both of the above can be evaluated further with MR abdomen to include MRCP        Workstation performed: [id number]         CT abdomen pelvis with contrast   ED Interpretation by Saida Tavares MD (03/30 9837)   FINDINGS:     ABDOMEN     LOWER CHEST:  No clinically significant abnormality identified in the visualized lower chest      LIVER/BILIARY TREE:  The liver is unremarkable  The common bile duct is dilated to 9 mm  No intrahepatic biliary ductal dilatation  No obvious obstructing calculus or mass seen  Correlation with liver function tests advised      GALLBLADDER:  No calcified gallstones  No pericholecystic inflammatory change      SPLEEN:  Unremarkable      PANCREAS:  Unremarkable      ADRENAL GLANDS:  Unremarkable      KIDNEYS/URETERS:  Tiny subcentimeter cyst in the upper left kidney  No hydronephrosis  No hydroureter      STOMACH AND BOWEL:  Tiny hiatal hernia  No bowel obstruction  No CT evidence for colitis or diverticulitis      APPENDIX:  A normal appendix was visualized      ABDOMINOPELVIC CAVITY:  No ascites  No pneumoperitoneum  No lymphadenopathy      VESSELS:  Unremarkable for patient's age      PELVIS     REPRODUCTIVE ORGANS:  Unremarkable for patient's age         URINARY BLADDER:  Unremarkable      ABDOMINAL WALL/INGUINAL REGIONS:  Tiny fat-containing periumbilical hernia      OSSEOUS STRUCTURES:  No acute fracture or destructive osseous lesion      IMPRESSION:     Dilated common bile duct measuring up to 9 mm  Correlation with liver function tests advised      Otherwise, no acute CT findings in the abdomen or pelvis            Workstation performed: [id number]      Final Result by Kiesha Sierra MD (03/30 1526)      Dilated common bile duct measuring up to 9 mm  Correlation with liver function tests advised  Otherwise, no acute CT findings in the abdomen or pelvis              Workstation performed: [id number]         MRI abdomen w wo contrast and mrcp    (Results Pending)         Procedures  Procedures      ED Course                                       MDM  Number of Diagnoses or Management Options  Bilious vomiting with nausea: new and requires workup  Trichomonas infection: new and requires workup  Diagnosis management comments: Augustina [place] comes to the emergency department were persistent nausea and vomiting  Notable for left lower quadrant point tenderness  Based on presenting complaints to the emergency department initial laboratory studies were conducted:  CBC, CMP, lipase were all unremarkable  Urinalysis was notable for a negative pregnancy test and notable for presence of trichomonads  Secondary to focal findings on physical examination, a CT of the abdomen and pelvis was conducted which demonstrated increased CBD dilation with no other acute intra-abdominal pathology  Patient was administered oral Zofran and given a L of fluids for hydration while in the emergency department  Upon reassessment of patient after these therapies, she described moderate improvement in her nausea  Ultrasound of the right upper quadrant was ordered secondary to the increased CBD dilation in the CT scan  Ultrasound findings demonstrated increased hepatic heterogeneity and focal masslike area that radiology commented would be better characterized with MRCP  Based off of these findings, this case was discussed with the on-call resident for the surgical team   After discussion with this provider, it was decided that there was no surgical intervention from their perspective  They recommended reaching out to the GI team for continued evaluation of her symptoms and potential evaluation for hepatitis  After the case was discussed with the on hospital call GI representative, it was decided that the patient was clinically okay that she can be scheduled for outpatient MRCP with GI follow-up for continued evaluation of radiographic findings  This plan was discussed with the patient at the bedside who understood that she needed to get a specific imaging study in following with GI as soon as possible  Ambulatory referral was provided for both the test and GI follow-up  Patient was provided with antibiotic therapy for the presence of Trichomonas in her urine (Flagyl 500 b i d  For 7 days) and provided with a script of Zofran for nausea control at home  Patient was counseled to follow-up with their PCP as soon as possible for continued evaluation of symptoms and continue utilization prescription medications as needed for symptom control  Strict return precautions I would were continued evaluation emergency department were discussed  Patient expressed understanding with this plan  Disposition:  Discharged home with close PCP follow-up, MRCP for continued evaluation of right upper quadrant with close GI follow-up, antibiotic therapy, and strict return precautions discussed at bedside  Amount and/or Complexity of Data Reviewed  Clinical lab tests: ordered and reviewed  Tests in the radiology section of CPT®: ordered and reviewed  Review and summarize past medical records: yes  Discuss the patient with other providers: yes  Independent visualization of images, tracings, or specimens: yes    Risk of Complications, Morbidity, and/or Mortality  Presenting problems: moderate  Diagnostic procedures: moderate  Management options: moderate    Patient Progress  Patient progress: stable      Disposition  Final diagnoses:   Trichomonas infection   Bilious vomiting with nausea     Time reflects when diagnosis was documented in both MDM as applicable and the Disposition within this note     Time User Action Codes Description Comment    3/30/2022  4:30 PM Bro Mahan Add [A59 9] Trichomonas infection     3/30/2022  4:30 PM Bro Mahan Add [R11 14] Bilious vomiting with nausea       ED Disposition     ED Disposition Condition Date/Time Comment    Discharge Stable Wed Mar 30, 2022  5:12 PM Cal Murillo discharge to home/self care              Follow-up Information     Follow up With Specialties Details Why Contact Info Additional Information    [facility] Emergency [facility]  As needed, If symptoms worsen [address] [facility] [address] Emergency Department, [address], [place], [place], [address] [facility] Schedule an appointment as soon as possible for a visit  For evaluation of ultrasound and CT findings  [address] [address] [facility], [address] [facility] [address] [place], [place], [address] [phone]          Patient's Medications   Discharge Prescriptions    METRONIDAZOLE (FLAGYL) 500 MG TABLET    Take 1 tablet (500 mg total) by mouth every 12 (twelve) hours for 7 days       Start Date: 3/30/2022 End Date: 4/6/2022       Order Dose: 500 mg       Quantity: 14 tablet    Refills: 0    ONDANSETRON (ZOFRAN ODT) 4 MG DISINTEGRATING TABLET    Take 1 tablet (4 mg total) by mouth every 6 (six) hours as needed for nausea or vomiting       Start Date: 3/30/2022 End Date: --       Order Dose: 4 mg       Quantity: 20 tablet    Refills: 0     Outpatient Discharge Orders   MRI abdomen w wo contrast and mrcp   Standing Status: Future Standing Exp  Date: 03/30/26       PDMP Review     None           ED Provider  Attending physically available and evaluated Loralyn Scheuermann I managed the patient along with the ED Attending      Electronically Signed by         Obdulia Stovall MD  03/30/22 0881

## 2022-03-31 NOTE — ED ATTENDING ATTESTATION
3/30/2022  IJerardo MD, saw and evaluated the patient  I have discussed the patient with the resident/non-physician practitioner and agree with the resident's/non-physician practitioner's findings, Plan of Care, and MDM as documented in the resident's/non-physician practitioner's note, except where noted  All available labs and Radiology studies were reviewed  I was present for key portions of any procedure(s) performed by the resident/non-physician practitioner and I was immediately available to provide assistance  At this point I agree with the current assessment done in the Emergency Department  I have conducted an independent evaluation of this patient a history and physical is as follows:    78-year-old presenting with nausea and vomiting and abdominal pain  Nonbloody  No diarrhea  No fevers  No chest pain or shortness of breath      Agree with workup by resident, check abdominal labs, CT, urine, urine preg, symptomatic treatment    ED Course         Critical Care Time  Procedures

## 2022-06-23 ENCOUNTER — TELEPHONE (OUTPATIENT)
Dept: GASTROENTEROLOGY | Facility: CLINIC | Age: 32
End: 2022-06-23

## 2022-06-23 NOTE — TELEPHONE ENCOUNTER
Called pt but was not able to leave message or speak to pt       Dx: R11 14 (ICD-10-CM) - Bilious vomiting with nausea

## 2022-07-01 ENCOUNTER — TELEPHONE (OUTPATIENT)
Dept: GASTROENTEROLOGY | Facility: CLINIC | Age: 32
End: 2022-07-01

## 2022-07-01 NOTE — TELEPHONE ENCOUNTER
Called pt to schedule appt but didn't leave a message or speak to pt because the phone message said that this was not an in service number

## 2022-12-07 ENCOUNTER — HOSPITAL ENCOUNTER (EMERGENCY)
Facility: HOSPITAL | Age: 32
Discharge: HOME | End: 2022-12-07
Attending: EMERGENCY MEDICINE

## 2022-12-07 ENCOUNTER — APPOINTMENT (EMERGENCY)
Dept: RADIOLOGY | Facility: HOSPITAL | Age: 32
End: 2022-12-07

## 2022-12-07 ENCOUNTER — APPOINTMENT (EMERGENCY)
Dept: CT IMAGING | Facility: HOSPITAL | Age: 32
End: 2022-12-07

## 2022-12-07 VITALS
HEART RATE: 60 BPM | TEMPERATURE: 98.4 F | DIASTOLIC BLOOD PRESSURE: 65 MMHG | SYSTOLIC BLOOD PRESSURE: 143 MMHG | OXYGEN SATURATION: 95 % | RESPIRATION RATE: 18 BRPM

## 2022-12-07 DIAGNOSIS — R11.2 NAUSEA VOMITING AND DIARRHEA: ICD-10-CM

## 2022-12-07 DIAGNOSIS — J10.1 INFLUENZA A: Primary | ICD-10-CM

## 2022-12-07 DIAGNOSIS — N39.0 UTI (URINARY TRACT INFECTION): ICD-10-CM

## 2022-12-07 DIAGNOSIS — R19.7 NAUSEA VOMITING AND DIARRHEA: ICD-10-CM

## 2022-12-07 LAB
ALBUMIN SERPL BCP-MCNC: 4.6 G/DL (ref 3.5–5)
ALP SERPL-CCNC: 55 U/L (ref 34–104)
ALT SERPL W P-5'-P-CCNC: 12 U/L (ref 7–52)
AMORPH URATE CRY URNS QL MICRO: ABNORMAL
ANION GAP SERPL CALCULATED.3IONS-SCNC: 11 MMOL/L (ref 4–13)
AST SERPL W P-5'-P-CCNC: 18 U/L (ref 13–39)
BACTERIA UR QL AUTO: ABNORMAL /HPF
BASOPHILS # BLD AUTO: 0.02 THOUSANDS/ÂΜL (ref 0–0.1)
BASOPHILS NFR BLD AUTO: 1 % (ref 0–1)
BILIRUB SERPL-MCNC: 0.38 MG/DL (ref 0.2–1)
BILIRUB UR QL STRIP: NEGATIVE
BUN SERPL-MCNC: 8 MG/DL (ref 5–25)
CALCIUM SERPL-MCNC: 9.4 MG/DL (ref 8.4–10.2)
CHLORIDE SERPL-SCNC: 104 MMOL/L (ref 96–108)
CLARITY UR: ABNORMAL
CO2 SERPL-SCNC: 22 MMOL/L (ref 21–32)
COLOR UR: YELLOW
CREAT SERPL-MCNC: 0.73 MG/DL (ref 0.6–1.3)
EOSINOPHIL # BLD AUTO: 0 THOUSAND/ÂΜL (ref 0–0.61)
EOSINOPHIL NFR BLD AUTO: 0 % (ref 0–6)
ERYTHROCYTE [DISTWIDTH] IN BLOOD BY AUTOMATED COUNT: 12.7 % (ref 11.6–15.1)
EXT PREGNANCY TEST URINE: NEGATIVE
EXT. CONTROL: NORMAL
FLUAV RNA RESP QL NAA+PROBE: POSITIVE
FLUBV RNA RESP QL NAA+PROBE: NEGATIVE
GFR SERPL CREATININE-BSD FRML MDRD: 107 ML/MIN/1.73SQ M
GLUCOSE SERPL-MCNC: 114 MG/DL (ref 65–140)
GLUCOSE UR STRIP-MCNC: NEGATIVE MG/DL
GRAN CASTS #/AREA URNS LPF: ABNORMAL /[LPF]
HCT VFR BLD AUTO: 41.5 % (ref 34.8–46.1)
HGB BLD-MCNC: 14.2 G/DL (ref 11.5–15.4)
HGB UR QL STRIP.AUTO: NEGATIVE
IMM GRANULOCYTES # BLD AUTO: 0.01 THOUSAND/UL (ref 0–0.2)
IMM GRANULOCYTES NFR BLD AUTO: 0 % (ref 0–2)
KETONES UR STRIP-MCNC: ABNORMAL MG/DL
LEUKOCYTE ESTERASE UR QL STRIP: ABNORMAL
LIPASE SERPL-CCNC: 9 U/L (ref 11–82)
LYMPHOCYTES # BLD AUTO: 1.02 THOUSANDS/ÂΜL (ref 0.6–4.47)
LYMPHOCYTES NFR BLD AUTO: 24 % (ref 14–44)
MCH RBC QN AUTO: 29.6 PG (ref 26.8–34.3)
MCHC RBC AUTO-ENTMCNC: 34.2 G/DL (ref 31.4–37.4)
MCV RBC AUTO: 87 FL (ref 82–98)
MONOCYTES # BLD AUTO: 0.33 THOUSAND/ÂΜL (ref 0.17–1.22)
MONOCYTES NFR BLD AUTO: 8 % (ref 4–12)
MUCOUS THREADS UR QL AUTO: ABNORMAL
NEUTROPHILS # BLD AUTO: 2.86 THOUSANDS/ÂΜL (ref 1.85–7.62)
NEUTS SEG NFR BLD AUTO: 67 % (ref 43–75)
NITRITE UR QL STRIP: NEGATIVE
NON-SQ EPI CELLS URNS QL MICRO: ABNORMAL /HPF
NRBC BLD AUTO-RTO: 0 /100 WBCS
PH UR STRIP.AUTO: 6.5 [PH]
PLATELET # BLD AUTO: 226 THOUSANDS/UL (ref 149–390)
PMV BLD AUTO: 8.9 FL (ref 8.9–12.7)
POTASSIUM SERPL-SCNC: 3.8 MMOL/L (ref 3.5–5.3)
PROT SERPL-MCNC: 7.8 G/DL (ref 6.4–8.4)
PROT UR STRIP-MCNC: ABNORMAL MG/DL
RBC # BLD AUTO: 4.8 MILLION/UL (ref 3.81–5.12)
RBC #/AREA URNS AUTO: ABNORMAL /HPF
RSV RNA RESP QL NAA+PROBE: NEGATIVE
S PYO DNA THROAT QL NAA+PROBE: NOT DETECTED
SARS-COV-2 RNA RESP QL NAA+PROBE: NEGATIVE
SODIUM SERPL-SCNC: 137 MMOL/L (ref 135–147)
SP GR UR STRIP.AUTO: 1.03 (ref 1–1.03)
UROBILINOGEN UR STRIP-ACNC: <2 MG/DL
WBC # BLD AUTO: 4.24 THOUSAND/UL (ref 4.31–10.16)
WBC #/AREA URNS AUTO: ABNORMAL /HPF

## 2022-12-07 RX ORDER — ONDANSETRON 4 MG/1
4 TABLET, ORALLY DISINTEGRATING ORAL ONCE
Status: COMPLETED | OUTPATIENT
Start: 2022-12-07 | End: 2022-12-07

## 2022-12-07 RX ORDER — ONDANSETRON 4 MG/1
4 TABLET, FILM COATED ORAL EVERY 8 HOURS PRN
Qty: 15 TABLET | Refills: 0 | Status: SHIPPED | OUTPATIENT
Start: 2022-12-07 | End: 2022-12-14

## 2022-12-07 RX ORDER — OSELTAMIVIR PHOSPHATE 75 MG/1
75 CAPSULE ORAL ONCE
Status: COMPLETED | OUTPATIENT
Start: 2022-12-07 | End: 2022-12-07

## 2022-12-07 RX ORDER — OSELTAMIVIR PHOSPHATE 75 MG/1
75 CAPSULE ORAL EVERY 12 HOURS
Qty: 10 CAPSULE | Refills: 0 | Status: SHIPPED | OUTPATIENT
Start: 2022-12-07 | End: 2022-12-12

## 2022-12-07 RX ORDER — CEPHALEXIN 500 MG/1
500 CAPSULE ORAL EVERY 6 HOURS SCHEDULED
Qty: 19 CAPSULE | Refills: 0 | Status: SHIPPED | OUTPATIENT
Start: 2022-12-07 | End: 2022-12-12

## 2022-12-07 RX ORDER — CEPHALEXIN 250 MG/1
500 CAPSULE ORAL ONCE
Status: COMPLETED | OUTPATIENT
Start: 2022-12-07 | End: 2022-12-07

## 2022-12-07 RX ADMIN — ONDANSETRON 4 MG: 4 TABLET, ORALLY DISINTEGRATING ORAL at 04:48

## 2022-12-07 RX ADMIN — ONDANSETRON 4 MG: 4 TABLET, ORALLY DISINTEGRATING ORAL at 01:54

## 2022-12-07 RX ADMIN — OSELTAMIVIR PHOSPHATE 75 MG: 75 CAPSULE ORAL at 04:53

## 2022-12-07 RX ADMIN — CEPHALEXIN 500 MG: 250 CAPSULE ORAL at 04:53

## 2022-12-07 RX ADMIN — IOHEXOL 100 ML: 350 INJECTION, SOLUTION INTRAVENOUS at 03:42

## 2022-12-07 RX ADMIN — SODIUM CHLORIDE, POTASSIUM CHLORIDE, SODIUM LACTATE AND CALCIUM CHLORIDE 1000 ML: 600; 310; 30; 20 INJECTION, SOLUTION INTRAVENOUS at 02:51

## 2022-12-07 NOTE — ED PROVIDER NOTES
History  Chief Complaint   Patient presents with   • Vomiting     Pt presents to ED c/o n/v/d, chills and headache x5 days  Pt has tried at home remedies with no success  15-year-old female who presents to the emergency department for nausea/vomiting/diarrhea since today  Patient states she had 3 episodes of vomiting and approximately 5 episodes of diarrhea today  Patient states she tried Pepto-Bismol and antacid without relief  Patient reports generalized abdominal pain described as bloating  Patient also notes a few days ago she developed flulike symptoms  Patient states flulike symptoms include subjective fever, chills, cough, headaches, myalgias, sore throat, wheezing  Patient denies any chest pain, shortness of breath, hematemesis, hematochezia, ear pain, arthralgias, focal weakness, dysuria, hematuria  History provided by:  Patient  Vomiting  Severity:  Moderate  Timing:  Constant  Number of daily episodes:  3  Quality:  Stomach contents  Progression:  Unchanged  Chronicity:  New  Recent urination:  Normal  Relieved by:  Nothing  Worsened by:  Nothing  Ineffective treatments:  Antiemetics  Associated symptoms: abdominal pain, chills, cough, diarrhea, fever, headaches, myalgias and sore throat    Associated symptoms: no arthralgias    Abdominal pain:     Location:  Generalized    Quality: bloating      Severity:  Mild    Progression:  Waxing and waning  Cough:     Cough characteristics:  Non-productive    Severity:  Moderate  Fever:     Temp source:  Subjective      Prior to Admission Medications   Prescriptions Last Dose Informant Patient Reported?  Taking?   ibuprofen (MOTRIN) 600 mg tablet   No No   Sig: Take 1 tablet (600 mg total) by mouth every 6 (six) hours as needed for mild pain   ondansetron (ZOFRAN ODT) 4 mg disintegrating tablet   No No   Sig: Take 2 tablets by mouth every 8 (eight) hours as needed for nausea or vomiting for up to 10 doses   ondansetron (Zofran ODT) 4 mg disintegrating tablet   No No   Sig: Take 1 tablet (4 mg total) by mouth every 6 (six) hours as needed for nausea or vomiting      Facility-Administered Medications: None       Past Medical History:   Diagnosis Date   • GERD (gastroesophageal reflux disease)        Past Surgical History:   Procedure Laterality Date   • TOOTH EXTRACTION         History reviewed  No pertinent family history  I have reviewed and agree with the history as documented  E-Cigarette/Vaping   • E-Cigarette Use Never User      E-Cigarette/Vaping Substances     Social History     Tobacco Use   • Smoking status: Every Day     Packs/day: 0 25     Types: Cigarettes   • Smokeless tobacco: Current   Vaping Use   • Vaping Use: Never used   Substance Use Topics   • Alcohol use: No   • Drug use: Yes     Types: Marijuana        Review of Systems   Constitutional: Positive for chills and fever  HENT: Positive for sore throat  Negative for ear pain  Eyes: Negative for pain and visual disturbance  Respiratory: Positive for cough and wheezing  Negative for shortness of breath  Cardiovascular: Negative for chest pain and palpitations  Gastrointestinal: Positive for abdominal pain, diarrhea, nausea and vomiting  Negative for blood in stool and constipation  Genitourinary: Negative for dysuria and hematuria  Musculoskeletal: Positive for myalgias  Negative for arthralgias and back pain  Skin: Negative for color change and rash  Neurological: Positive for headaches  Negative for syncope and light-headedness  All other systems reviewed and are negative        Physical Exam  ED Triage Vitals   Temperature Pulse Respirations Blood Pressure SpO2   12/07/22 0146 12/07/22 0147 12/07/22 0146 12/07/22 0147 12/07/22 0146   98 4 °F (36 9 °C) 70 18 161/92 98 %      Temp Source Heart Rate Source Patient Position - Orthostatic VS BP Location FiO2 (%)   12/07/22 0146 12/07/22 0146 12/07/22 0146 12/07/22 0146 --   Oral Monitor Sitting Right arm Pain Score       12/07/22 0146       No Pain             Orthostatic Vital Signs  Vitals:    12/07/22 0146 12/07/22 0147 12/07/22 0300   BP:  161/92 143/65   Pulse:  70 60   Patient Position - Orthostatic VS: Sitting         Physical Exam  Vitals and nursing note reviewed  Constitutional:       General: She is not in acute distress  Appearance: She is well-developed  HENT:      Head: Normocephalic and atraumatic  Right Ear: External ear normal       Left Ear: External ear normal       Nose: Nose normal  No rhinorrhea  Mouth/Throat:      Mouth: Mucous membranes are dry  No oral lesions  Pharynx: Oropharynx is clear  Uvula midline  Posterior oropharyngeal erythema present  No pharyngeal swelling, oropharyngeal exudate or uvula swelling  Tonsils: No tonsillar exudate  Eyes:      Conjunctiva/sclera: Conjunctivae normal    Cardiovascular:      Rate and Rhythm: Normal rate and regular rhythm  Heart sounds: No murmur heard  Pulmonary:      Effort: Pulmonary effort is normal  No respiratory distress  Breath sounds: Wheezing (throughout) present  No rhonchi or rales  Chest:      Chest wall: No tenderness  Abdominal:      General: Bowel sounds are normal       Palpations: Abdomen is soft  Tenderness: There is generalized abdominal tenderness  There is no guarding or rebound  Negative signs include McBurney's sign  Musculoskeletal:         General: No swelling  Cervical back: Neck supple  Skin:     General: Skin is warm and dry  Capillary Refill: Capillary refill takes less than 2 seconds  Neurological:      General: No focal deficit present  Mental Status: She is alert and oriented to person, place, and time     Psychiatric:         Mood and Affect: Mood normal          ED Medications  Medications   ondansetron (ZOFRAN-ODT) dispersible tablet 4 mg (4 mg Oral Given 12/7/22 0154)   lactated ringers bolus 1,000 mL (0 mL Intravenous Stopped 12/7/22 0420) iohexol (OMNIPAQUE) 350 MG/ML injection (SINGLE-DOSE) 100 mL (100 mL Intravenous Given 12/7/22 0342)   cephalexin (KEFLEX) capsule 500 mg (500 mg Oral Given 12/7/22 0453)   oseltamivir (TAMIFLU) capsule 75 mg (75 mg Oral Given 12/7/22 0453)   ondansetron (ZOFRAN-ODT) dispersible tablet 4 mg (4 mg Oral Given 12/7/22 0448)       Diagnostic Studies  Results Reviewed     Procedure Component Value Units Date/Time    Urine culture [[id number]] Collected: 12/07/22 0239    Lab Status: In process Specimen: Urine, Clean Catch Updated: 12/07/22 0503    Urine Microscopic [[id number]]  (Abnormal) Collected: 12/07/22 0239    Lab Status: Final result Specimen: Urine, Clean Catch Updated: 12/07/22 0428     RBC, UA 30-50 /hpf      WBC, UA 2-4 /hpf      Epithelial Cells Occasional /hpf      Bacteria, UA Occasional /hpf      MUCUS THREADS Occasional     Granular Casts, UA 0-3     Amorphous Crystals, UA Occasional    FLU/RSV/COVID - if FLU/RSV clinically relevant [[id number]]  (Abnormal) Collected: 12/07/22 0239    Lab Status: Final result Specimen: Nares from Nose Updated: 12/07/22 0420     SARS-CoV-2 Negative     INFLUENZA A PCR Positive     INFLUENZA B PCR Negative     RSV PCR Negative    Narrative:      FOR PEDIATRIC PATIENTS - copy/paste COVID Guidelines URL to browser: [email or web address]    SARS-CoV-2 assay is a Nucleic Acid Amplification assay intended for the  qualitative detection of nucleic acid from SARS-CoV-2 in nasopharyngeal  swabs  Results are for the presumptive identification of SARS-CoV-2 RNA  Positive results are indicative of infection with SARS-CoV-2, the virus  causing COVID-19, but do not rule out bacterial infection or co-infection  with other viruses  Laboratories within the [place] and its  territories are required to report all positive results to the appropriate  public health authorities   Negative results do not preclude SARS-CoV-2  infection and should not be used as the sole basis for treatment or other  patient management decisions  Negative results must be combined with  clinical observations, patient history, and epidemiological information  This test has not been FDA cleared or approved  This test has been authorized by FDA under an Emergency Use Authorization  (EUA)  This test is only authorized for the duration of time the  declaration that circumstances exist justifying the authorization of the  emergency use of an in vitro diagnostic tests for detection of SARS-CoV-2  virus and/or diagnosis of COVID-19 infection under section 564(b)(1) of  the Act, 21 U  S C  932SVP-1(N)(0), unless the authorization is terminated  or revoked sooner  The test has been validated but independent review by FDA  and CLIA is pending  Test performed using [employer]: This RT-PCR assay targets N2,  a region unique to SARS-CoV-2  A conserved region in the E-gene was chosen  for pan-Sarbecovirus detection which includes SARS-CoV-2  According to CMS-2020-01-R, this platform meets the definition of high-throughput technology      Strep A PCR [[id number]]  (Normal) Collected: 12/07/22 0239    Lab Status: Final result Specimen: Throat Updated: 12/07/22 0407     STREP A PCR Not Detected    UA w Reflex to Microscopic w Reflex to Culture [[id number]]  (Abnormal) Collected: 12/07/22 0239    Lab Status: Final result Specimen: Urine, Clean Catch Updated: 12/07/22 0354     Color, UA Yellow     Clarity, UA Turbid     Specific [place], UA 1 030     pH, UA 6 5     Leukocytes, UA Trace     Nitrite, UA Negative     Protein,  (2+) mg/dl      Glucose, UA Negative mg/dl      Ketones, UA 20 (1+) mg/dl      Urobilinogen, UA <2 0 mg/dl      Bilirubin, UA Negative     Occult Blood, UA Negative    POCT pregnancy, urine [[id number]]  (Normal) Resulted: 12/07/22 0319    Lab Status: Final result Updated: 12/07/22 0319     EXT Preg Test, Ur Negative     Control Valid Comprehensive metabolic panel [[id number]] Collected: 12/07/22 0154    Lab Status: Final result Specimen: Blood from Arm, Right Updated: 12/07/22 0223     Sodium 137 mmol/L      Potassium 3 8 mmol/L      Chloride 104 mmol/L      CO2 22 mmol/L      ANION GAP 11 mmol/L      BUN 8 mg/dL      Creatinine 0 73 mg/dL      Glucose 114 mg/dL      Calcium 9 4 mg/dL      AST 18 U/L      ALT 12 U/L      Alkaline Phosphatase 55 U/L      Total Protein 7 8 g/dL      Albumin 4 6 g/dL      Total Bilirubin 0 38 mg/dL      eGFR 107 ml/min/1 73sq m     Narrative:      National Kidney Disease Foundation guidelines for Chronic Kidney Disease (CKD):   •  Stage 1 with normal or high GFR (GFR > 90 mL/min/1 73 square meters)  •  Stage 2 Mild CKD (GFR = 60-89 mL/min/1 73 square meters)  •  Stage 3A Moderate CKD (GFR = 45-59 mL/min/1 73 square meters)  •  Stage 3B Moderate CKD (GFR = 30-44 mL/min/1 73 square meters)  •  Stage 4 Severe CKD (GFR = 15-29 mL/min/1 73 square meters)  •  Stage 5 End Stage CKD (GFR <15 mL/min/1 73 square meters)  Note: GFR calculation is accurate only with a steady state creatinine    Lipase [[id number]]  (Abnormal) Collected: 12/07/22 0154    Lab Status: Final result Specimen: Blood from Arm, Right Updated: 12/07/22 0223     Lipase 9 u/L     CBC and differential [[id number]]  (Abnormal) Collected: 12/07/22 0154    Lab Status: Final result Specimen: Blood from Arm, Right Updated: 12/07/22 0200     WBC 4 24 Thousand/uL      RBC 4 80 Million/uL      Hemoglobin 14 2 g/dL      Hematocrit 41 5 %      MCV 87 fL      MCH 29 6 pg      MCHC 34 2 g/dL      RDW 12 7 %      MPV 8 9 fL      Platelets 261 Thousands/uL      nRBC 0 /100 WBCs      Neutrophils Relative 67 %      Immat GRANS % 0 %      Lymphocytes Relative 24 %      Monocytes Relative 8 %      Eosinophils Relative 0 %      Basophils Relative 1 %      Neutrophils Absolute 2 86 Thousands/µL      Immature Grans Absolute 0 01 Thousand/uL      Lymphocytes Absolute 1 02 Thousands/µL      Monocytes Absolute 0 33 Thousand/µL      Eosinophils Absolute 0 00 Thousand/µL      Basophils Absolute 0 02 Thousands/µL                  CT abdomen pelvis with contrast   Final Result by Ceferino Patrick MD (12/07 0415)      No acute intra-abdominal abnormality  Small amount of pelvic free fluid likely physiologic in nature  Workstation performed: [id number]         XR chest 1 view portable   ED Interpretation by Keri Escoto DO (12/07 1398)   No acute cardiopulmonary process visualized            Procedures  Procedures      ED Course  ED Course as of 12/07/22 0706   Wed Dec 07, 2022   2843 Blood Pressure: 161/92   0218 Temperature: 98 4 °F (36 9 °C)  Oral temp   0218 Pulse: 70   0218 Respirations: 18   0219 SpO2: 98 %   0219 26 yo F Presents with NVD and flulike symptoms  No hematemesis, no hematochezia no dysuria, hematuria  Chest pain, no shortness of breath, rashes  PE: Wheezing throughout all lung fields, generalized abdominal tenderness,  dry mucous membranes, posterior oral pharynx erythema  No swelling to throat no tonsillar exudates    Concern for: COVID/Flu/RSV vs PNA vs UTI   0219 CBC and differential(!)  Leukopenia  Otherwise unremarkable   0238 Lipase(!): 9   0238 Comprehensive metabolic panel   5695 PREGNANCY TEST URINE: Negative   0333 XR chest 1 view portable  No acute cardiopulmonary process visualized   0411 UA w Reflex to Microscopic w Reflex to Culture(!)  Ketones, protein positive  Most likely due to dehydration  Trace leukocytes  9605 STREP A PCR: Not Detected   4394 CT abdomen pelvis with contrast  No acute intra-abdominal abnormality      Small amount of pelvic free fluid likely physiologic in nature  0425 INFLU A PCR(!): Positive   0428 Urine Microscopic(!)  30-50 RBC, 2-4 WBC, occasional bacteria, occasional epithelial cells, occasional mucus threads, occasional amorphous crystals    Potential UTI versus contaminant versus dehydration   0430 Results discussed with patient  Patient expressed understanding  Plan: d/c home with Tamiflu, Zofran, and Keflex rx for influenza A, NV, and UTI respectively  Instructed pt to follow-up with PCP as needed  Instructed to RTED for any new or worsening symptoms  Pt expressed understanding and was agreeable with plan  Pt wa given the opportunity to ask questions  All questions and concerns were addressed in the ER                       PERC Rule for PE    Flowsheet Row Most Recent Value   PERC Rule for PE    Age >=50 0 Filed at: 12/07/2022 0239   HR >=100 0 Filed at: 12/07/2022 0239   O2 Sat on room air < 95% 0 Filed at: 12/07/2022 0239   History of PE or DVT 0 Filed at: 12/07/2022 3063   Recent trauma or surgery 0 Filed at: 12/07/2022 0239   Hemoptysis 0 Filed at: 12/07/2022 0239   Exogenous estrogen 0 Filed at: 12/07/2022 0239   Unilateral leg swelling 0 Filed at: 12/07/2022 0239   PERC Rule for PE Results 0 Filed at: 12/07/2022 8541                  Wells' Criteria for PE    Flowsheet Row Most Recent Value   Wells' Criteria for PE    Clinical signs and symptoms of DVT 0 Filed at: 12/07/2022 6005   PE is primary diagnosis or equally likely 0 Filed at: 12/07/2022 0239   HR >100 0 Filed at: 12/07/2022 0239   Immobilization at least 3 days or Surgery in the previous 4 weeks 0 Filed at: 12/07/2022 3839   Previous, objectively diagnosed PE or DVT 0 Filed at: 12/07/2022 0239   Hemoptysis 0 Filed at: 12/07/2022 0261   Malignancy with treatment within 6 months or palliative 0 Filed at: 12/07/2022 1356   Wells' Criteria Total 0 Filed at: 12/07/2022 3432            MDM  Number of Diagnoses or Management Options  Influenza A: new and requires workup  Nausea vomiting and diarrhea: new and requires workup  UTI (urinary tract infection): new and requires workup  Diagnosis management comments: See ED course for more details on MDM       Amount and/or Complexity of Data Reviewed  Clinical lab tests: ordered and reviewed  Independent visualization of images, tracings, or specimens: yes    Patient Progress  Patient progress: stable      Disposition  Final diagnoses:   Influenza A   Nausea vomiting and diarrhea   UTI (urinary tract infection)     Time reflects when diagnosis was documented in both MDM as applicable and the Disposition within this note     Time User Action Codes Description Comment    12/7/2022  4:33 AM Tasia Johnson N Add [J10 1] Influenza A     12/7/2022  4:33 AM Richrd Brochure Add [R11 2,  R19 7] Nausea vomiting and diarrhea     12/7/2022  4:33 AM Richrd Brochure Add [N39 0] UTI (urinary tract infection)       ED Disposition     ED Disposition   Discharge    Condition   Stable    Date/Time   Wed Dec 7, 2022  4:33 AM    Comment   [facility] discharge to home/self care                 Follow-up Information     Follow up With Specialties Details Why Contact Info Additional Information    Your Primary Care Doctor  Call today let them know you were evaluated in the ER and would like to schedule a follow-up appointment      [place] [address] Emergency Department Emergency Medicine Go to  As needed, If symptoms worsen [address] [facility] [address] [place] [address] Emergency Department, [address], [place], [place], [address]          Discharge Medication List as of 12/7/2022  4:39 AM      START taking these medications    Details   cephalexin (KEFLEX) 500 mg capsule Take 1 capsule (500 mg total) by mouth every 6 (six) hours for 5 days, Starting Wed 12/7/2022, Until Mon 12/12/2022, Normal      ondansetron (Zofran) 4 mg tablet Take 1 tablet (4 mg total) by mouth every 8 (eight) hours as needed for nausea or vomiting for up to 7 days, Starting Wed 12/7/2022, Until Wed 12/14/2022 at 2359, Print      oseltamivir (TAMIFLU) 75 mg capsule Take 1 capsule (75 mg total) by mouth every 12 (twelve) hours for 5 days, Starting Wed 12/7/2022, Until Mon 12/12/2022, Normal         CONTINUE these medications which have NOT CHANGED    Details   ibuprofen (MOTRIN) 600 mg tablet Take 1 tablet (600 mg total) by mouth every 6 (six) hours as needed for mild pain, Starting Tue 11/6/2018, Print      !! ondansetron (ZOFRAN ODT) 4 mg disintegrating tablet Take 2 tablets by mouth every 8 (eight) hours as needed for nausea or vomiting for up to 10 doses, Starting 3/31/2017, Until Discontinued, Print      !! ondansetron (Zofran ODT) 4 mg disintegrating tablet Take 1 tablet (4 mg total) by mouth every 6 (six) hours as needed for nausea or vomiting, Starting Wed 3/30/2022, Normal       !! - Potential duplicate medications found  Please discuss with provider  No discharge procedures on file  PDMP Review     None           ED Provider  Attending physically available and evaluated Pablito Man I managed the patient along with the ED Attending      Electronically Signed by         Bhavya Barragan DO  12/07/22 8602

## 2022-12-07 NOTE — DISCHARGE INSTRUCTIONS
You were seen and evaluated in the emergency department for nausea,vomiting, diarrhea, and flulike symptoms  Found to have a urinary tract infection, influenza A  You are being discharged home with a prescription for Zofran for nausea and vomiting, Tamiflu for your influenza A, and Keflex for UTI  Urine culture has been ordered and you will be called if it is positive  Please call your primary care doctor to schedule follow-up appointment after being evaluated in the emergency department  Please return the emergency department for any new or worsening symptoms, including but not limited to: Chest pain, shortness of breath, passing out, heart racing or any new or worsening symptoms

## 2022-12-07 NOTE — Clinical Note
Delaney Weaver was seen and treated in our emergency department on 12/7/2022  Diagnosis:     Rianna Alvarado  may return to work on return date  She may return on this date: 12/09/2022         If you have any questions or concerns, please don't hesitate to call        Zaire Guido DO    ______________________________           _______________          _______________  Hospital Representative                              Date                                Time

## 2022-12-07 NOTE — ED ATTENDING ATTESTATION
12/7/2022  I, Krystina Farmer MD, saw and evaluated the patient  I have discussed the patient with the resident/non-physician practitioner and agree with the resident's/non-physician practitioner's findings, Plan of Care, and MDM as documented in the resident's/non-physician practitioner's note, except where noted  All available labs and Radiology studies were reviewed  I was present for key portions of any procedure(s) performed by the resident/non-physician practitioner and I was immediately available to provide assistance  At this point I agree with the current assessment done in the Emergency Department  I have conducted an independent evaluation of this patient a history and physical is as follows: Patient is a 27year old female with several days of N/V/D, fever, chills, cough, mild headache, sore throat  No travel  (+) ill contact  No abdominal surgery  Took pepto bismol and stools are darker now  (+) abdominal pain  No urinary sx  No GI bleeding  Was last seen in this ED on 3/30/22 for trichomonas and N/V  NCAT  Mucous membranes somewhat moist  Lungs with rhonci diffusely  Heart regular without murmur  Abdomen soft with diffuse tenderness  Good bowel sounds  No rash noted  No edema  DDx including but not limited to: viral illness, pneumonia, URI,  pharyngitis, influenza, COVID-19 (novel coronavirus); doubt cellulitis, meningitis, meningococcemia, sinusitis  DDx including but not limited to: appendicitis, gastroenteritis, gastritis, PUD, GERD, gastroparesis, hepatitis, pancreatitis, colitis, enteritis, food poisoning, mesenteric adenitis, epiploic appendagitis, IBD, IBS, ileus, bowel obstruction, volvulus, cholecystitis, biliary colic, choledocholithiasis, perforated viscus, tumor, splenic etiology, diverticulitis, internal hernia, constipation, pelvic pathology, renal colic, pyelonephritis, UTI  Will check labs and CXR and CT       ED Course         Critical Care Time  Procedures

## 2022-12-08 ENCOUNTER — HOSPITAL ENCOUNTER (EMERGENCY)
Facility: HOSPITAL | Age: 32
Discharge: HOME | End: 2022-12-08
Attending: EMERGENCY MEDICINE

## 2022-12-08 VITALS
WEIGHT: 165.34 LBS | SYSTOLIC BLOOD PRESSURE: 168 MMHG | HEART RATE: 83 BPM | BODY MASS INDEX: 30.24 KG/M2 | RESPIRATION RATE: 20 BRPM | TEMPERATURE: 98.3 F | DIASTOLIC BLOOD PRESSURE: 62 MMHG | OXYGEN SATURATION: 96 %

## 2022-12-08 DIAGNOSIS — R11.2 NAUSEA VOMITING AND DIARRHEA: Primary | ICD-10-CM

## 2022-12-08 DIAGNOSIS — R19.7 NAUSEA VOMITING AND DIARRHEA: Primary | ICD-10-CM

## 2022-12-08 DIAGNOSIS — K52.9 GASTROENTERITIS: ICD-10-CM

## 2022-12-08 LAB
ALBUMIN SERPL BCP-MCNC: 4.3 G/DL (ref 3.5–5)
ALP SERPL-CCNC: 50 U/L (ref 34–104)
ALT SERPL W P-5'-P-CCNC: 13 U/L (ref 7–52)
ANION GAP SERPL CALCULATED.3IONS-SCNC: 10 MMOL/L (ref 4–13)
AST SERPL W P-5'-P-CCNC: 18 U/L (ref 13–39)
BACTERIA UR CULT: NORMAL
BASOPHILS # BLD AUTO: 0.02 THOUSANDS/ÂΜL (ref 0–0.1)
BASOPHILS NFR BLD AUTO: 1 % (ref 0–1)
BILIRUB SERPL-MCNC: 0.49 MG/DL (ref 0.2–1)
BUN SERPL-MCNC: 8 MG/DL (ref 5–25)
CALCIUM SERPL-MCNC: 9.2 MG/DL (ref 8.4–10.2)
CHLORIDE SERPL-SCNC: 104 MMOL/L (ref 96–108)
CO2 SERPL-SCNC: 23 MMOL/L (ref 21–32)
CREAT SERPL-MCNC: 0.78 MG/DL (ref 0.6–1.3)
EOSINOPHIL # BLD AUTO: 0 THOUSAND/ÂΜL (ref 0–0.61)
EOSINOPHIL NFR BLD AUTO: 0 % (ref 0–6)
ERYTHROCYTE [DISTWIDTH] IN BLOOD BY AUTOMATED COUNT: 12.8 % (ref 11.6–15.1)
GFR SERPL CREATININE-BSD FRML MDRD: 99 ML/MIN/1.73SQ M
GLUCOSE SERPL-MCNC: 98 MG/DL (ref 65–140)
HCT VFR BLD AUTO: 39.1 % (ref 34.8–46.1)
HGB BLD-MCNC: 13.5 G/DL (ref 11.5–15.4)
IMM GRANULOCYTES # BLD AUTO: 0.01 THOUSAND/UL (ref 0–0.2)
IMM GRANULOCYTES NFR BLD AUTO: 0 % (ref 0–2)
LIPASE SERPL-CCNC: 7 U/L (ref 11–82)
LYMPHOCYTES # BLD AUTO: 1.26 THOUSANDS/ÂΜL (ref 0.6–4.47)
LYMPHOCYTES NFR BLD AUTO: 30 % (ref 14–44)
MCH RBC QN AUTO: 30 PG (ref 26.8–34.3)
MCHC RBC AUTO-ENTMCNC: 34.5 G/DL (ref 31.4–37.4)
MCV RBC AUTO: 87 FL (ref 82–98)
MONOCYTES # BLD AUTO: 0.22 THOUSAND/ÂΜL (ref 0.17–1.22)
MONOCYTES NFR BLD AUTO: 5 % (ref 4–12)
NEUTROPHILS # BLD AUTO: 2.73 THOUSANDS/ÂΜL (ref 1.85–7.62)
NEUTS SEG NFR BLD AUTO: 64 % (ref 43–75)
NRBC BLD AUTO-RTO: 0 /100 WBCS
PLATELET # BLD AUTO: 232 THOUSANDS/UL (ref 149–390)
PMV BLD AUTO: 9 FL (ref 8.9–12.7)
POTASSIUM SERPL-SCNC: 3.7 MMOL/L (ref 3.5–5.3)
PROT SERPL-MCNC: 7.3 G/DL (ref 6.4–8.4)
RBC # BLD AUTO: 4.5 MILLION/UL (ref 3.81–5.12)
SODIUM SERPL-SCNC: 137 MMOL/L (ref 135–147)
WBC # BLD AUTO: 4.24 THOUSAND/UL (ref 4.31–10.16)

## 2022-12-08 RX ORDER — METOCLOPRAMIDE HYDROCHLORIDE 5 MG/ML
10 INJECTION INTRAMUSCULAR; INTRAVENOUS ONCE
Status: COMPLETED | OUTPATIENT
Start: 2022-12-08 | End: 2022-12-08

## 2022-12-08 RX ORDER — METOCLOPRAMIDE 10 MG/1
10 TABLET ORAL 4 TIMES DAILY
Qty: 28 TABLET | Refills: 0 | Status: SHIPPED | OUTPATIENT
Start: 2022-12-08 | End: 2022-12-15

## 2022-12-08 RX ORDER — LOPERAMIDE HYDROCHLORIDE 2 MG/1
2 CAPSULE ORAL ONCE
Status: COMPLETED | OUTPATIENT
Start: 2022-12-08 | End: 2022-12-08

## 2022-12-08 RX ADMIN — SODIUM CHLORIDE 1000 ML: 0.9 INJECTION, SOLUTION INTRAVENOUS at 07:06

## 2022-12-08 RX ADMIN — METOCLOPRAMIDE 10 MG: 5 INJECTION, SOLUTION INTRAMUSCULAR; INTRAVENOUS at 07:31

## 2022-12-08 RX ADMIN — LOPERAMIDE HYDROCHLORIDE 2 MG: 2 CAPSULE ORAL at 07:32

## 2022-12-08 NOTE — ED PROVIDER NOTES
History  Chief Complaint   Patient presents with   • Diarrhea     Dx with flu and uti two nights ago  Is on tamiflu and keflex  Has diarrhea     Patient is a 27year old female with worsening N/V/D and sweating for past few days  (+) cough  No fever  No urinary sx  Was last seen in this ED on 12/7/22 for influenza A, N/v/D and UTI  Tried zofran at home without relief  Had negative urine HCG during last ED visit  [facility] website checked on this patient and no Rx found  Denies marijuana use  History provided by:  Patient   used: No    Diarrhea  Associated symptoms: diaphoresis and vomiting    Associated symptoms: no fever        Prior to Admission Medications   Prescriptions Last Dose Informant Patient Reported? Taking? cephalexin (KEFLEX) 500 mg capsule   No No   Sig: Take 1 capsule (500 mg total) by mouth every 6 (six) hours for 5 days   ibuprofen (MOTRIN) 600 mg tablet   No No   Sig: Take 1 tablet (600 mg total) by mouth every 6 (six) hours as needed for mild pain   ondansetron (ZOFRAN ODT) 4 mg disintegrating tablet   No No   Sig: Take 2 tablets by mouth every 8 (eight) hours as needed for nausea or vomiting for up to 10 doses   ondansetron (Zofran ODT) 4 mg disintegrating tablet   No No   Sig: Take 1 tablet (4 mg total) by mouth every 6 (six) hours as needed for nausea or vomiting   ondansetron (Zofran) 4 mg tablet   No No   Sig: Take 1 tablet (4 mg total) by mouth every 8 (eight) hours as needed for nausea or vomiting for up to 7 days   oseltamivir (TAMIFLU) 75 mg capsule   No No   Sig: Take 1 capsule (75 mg total) by mouth every 12 (twelve) hours for 5 days      Facility-Administered Medications: None       Past Medical History:   Diagnosis Date   • GERD (gastroesophageal reflux disease)        Past Surgical History:   Procedure Laterality Date   • TOOTH EXTRACTION         History reviewed  No pertinent family history    I have reviewed and agree with the history as documented  E-Cigarette/Vaping   • E-Cigarette Use Never User      E-Cigarette/Vaping Substances     Social History     Tobacco Use   • Smoking status: Every Day     Packs/day: 0 25     Types: Cigarettes   • Smokeless tobacco: Current   Vaping Use   • Vaping Use: Never used   Substance Use Topics   • Alcohol use: No   • Drug use: Yes     Types: Marijuana       Review of Systems   Constitutional: Positive for diaphoresis  Negative for fever  Respiratory: Positive for cough  Gastrointestinal: Positive for diarrhea, nausea and vomiting  Genitourinary: Negative for difficulty urinating  All other systems reviewed and are negative  Physical Exam  Physical Exam  Vitals and nursing note reviewed  Constitutional:       General: She is in acute distress (mild)  HENT:      Head: Normocephalic and atraumatic  Mouth/Throat:      Comments: Mask in place  Eyes:      General: No scleral icterus  Cardiovascular:      Rate and Rhythm: Normal rate and regular rhythm  Heart sounds: Normal heart sounds  No murmur heard  Pulmonary:      Effort: Pulmonary effort is normal  No respiratory distress  Breath sounds: Normal breath sounds  Abdominal:      General: Bowel sounds are normal       Palpations: Abdomen is soft  Tenderness: There is no abdominal tenderness  Musculoskeletal:         General: No deformity  Cervical back: Normal range of motion and neck supple  Right lower leg: No edema  Left lower leg: No edema  Skin:     General: Skin is warm and dry  Coloration: Skin is not jaundiced  Findings: No erythema or rash  Neurological:      General: No focal deficit present  Mental Status: She is alert and oriented to person, place, and time     Psychiatric:         Mood and Affect: Mood normal          Vital Signs  ED Triage Vitals [12/08/22 0638]   Temperature Pulse Respirations Blood Pressure SpO2   98 3 °F (36 8 °C) 83 20 168/62 96 %      Temp Source Heart Rate Source Patient Position - Orthostatic VS BP Location FiO2 (%)   Oral Monitor Sitting Right arm --      Pain Score       6           Vitals:    12/08/22 0638   BP: 168/62   Pulse: 83   Patient Position - Orthostatic VS: Sitting         Visual Acuity      ED Medications  Medications   sodium chloride 0 9 % bolus 1,000 mL (1,000 mL Intravenous New Bag 12/8/22 0706)   metoclopramide (REGLAN) injection 10 mg (10 mg Intravenous Given 12/8/22 0731)   loperamide (IMODIUM) capsule 2 mg (2 mg Oral Given 12/8/22 0732)       Diagnostic Studies  Results Reviewed     Procedure Component Value Units Date/Time    Lipase [[id number]]  (Abnormal) Collected: 12/08/22 0706    Lab Status: Final result Specimen: Blood from Arm, Right Updated: 12/08/22 0812     Lipase 7 u/L     Comprehensive metabolic panel [[id number]] Collected: 12/08/22 0706    Lab Status: Final result Specimen: Blood from Arm, Right Updated: 12/08/22 8906     Sodium 137 mmol/L      Potassium 3 7 mmol/L      Chloride 104 mmol/L      CO2 23 mmol/L      ANION GAP 10 mmol/L      BUN 8 mg/dL      Creatinine 0 78 mg/dL      Glucose 98 mg/dL      Calcium 9 2 mg/dL      AST 18 U/L      ALT 13 U/L      Alkaline Phosphatase 50 U/L      Total Protein 7 3 g/dL      Albumin 4 3 g/dL      Total Bilirubin 0 49 mg/dL      eGFR 99 ml/min/1 73sq m     Narrative:      [place] guidelines for Chronic Kidney Disease (CKD):   •  Stage 1 with normal or high GFR (GFR > 90 mL/min/1 73 square meters)  •  Stage 2 Mild CKD (GFR = 60-89 mL/min/1 73 square meters)  •  Stage 3A Moderate CKD (GFR = 45-59 mL/min/1 73 square meters)  •  Stage 3B Moderate CKD (GFR = 30-44 mL/min/1 73 square meters)  •  Stage 4 Severe CKD (GFR = 15-29 mL/min/1 73 square meters)  •  Stage 5 End Stage CKD (GFR <15 mL/min/1 73 square meters)  Note: GFR calculation is accurate only with a steady state creatinine    CBC and differential [[id number]]  (Abnormal) Collected: 12/08/22 0706    Lab Status: Final result Specimen: Blood from Arm, Right Updated: 12/08/22 0727     WBC 4 24 Thousand/uL      RBC 4 50 Million/uL      Hemoglobin 13 5 g/dL      Hematocrit 39 1 %      MCV 87 fL      MCH 30 0 pg      MCHC 34 5 g/dL      RDW 12 8 %      MPV 9 0 fL      Platelets 244 Thousands/uL      nRBC 0 /100 WBCs      Neutrophils Relative 64 %      Immat GRANS % 0 %      Lymphocytes Relative 30 %      Monocytes Relative 5 %      Eosinophils Relative 0 %      Basophils Relative 1 %      Neutrophils Absolute 2 73 Thousands/µL      Immature Grans Absolute 0 01 Thousand/uL      Lymphocytes Absolute 1 26 Thousands/µL      Monocytes Absolute 0 22 Thousand/µL      Eosinophils Absolute 0 00 Thousand/µL      Basophils Absolute 0 02 Thousands/µL     UA w Reflex to Microscopic w Reflex to Culture [[id number]]     Lab Status: No result Specimen: Urine, Clean Catch                  No orders to display              Procedures  Procedures         ED Course  ED Course as of 12/08/22 0844   u Dec 08, 2022   0840 Labs d/w patient  Patient feeling better  Will cancel UA since patient without urinary sx  MDM  Number of Diagnoses or Management Options  Diagnosis management comments: DDx including but not limited to: gastroenteritis, food poisoning, metabolic abnormality, dehydration, SUMMER, mesenteric adenitis; doubt appendicitis, IBD, IBS, ileus, bowel obstruction, toxic megacolon; adverse medication reaction, colitis, enteritis, gastritis, PUD, GERD, hepatitis, pancreatitis, influenza A; doubtcholecystitis, biliary colic, choledocholithiasis, doubt splenic etiology; doubt renal colic, pyelonephritis; UTI          Amount and/or Complexity of Data Reviewed  Clinical lab tests: ordered and reviewed  Decide to obtain previous medical records or to obtain history from someone other than the patient: yes  Review and summarize past medical records: yes  Independent visualization of images, tracings, or specimens: yes        Disposition  Final diagnoses:   Nausea vomiting and diarrhea   Gastroenteritis     Time reflects when diagnosis was documented in both MDM as applicable and the Disposition within this note     Time User Action Codes Description Comment    12/8/2022  8:42 AM [facility] Add [R11 2,  R19 7] Nausea vomiting and diarrhea     12/8/2022  8:43 [facility] [K[facility] 9] Gastroenteritis       ED Disposition     ED Disposition   Discharge    Condition   Stable    Date/Time   Thu Dec 8, 2022  8:42 AM    Comment   Carlos Manuel Elizalde discharge to home/self care  Follow-up Information     Follow up With Specialties Details Why [address], DO Family Medicine Call in 1 day Drink fluids  Return sooner if increased vomiting, worsening diarrhea, pain, fever, difficulty breathing or urinating, rash  [address]   [phone]            Patient's Medications   Discharge Prescriptions    METOCLOPRAMIDE (REGLAN) 10 MG TABLET    Take 1 tablet (10 mg total) by mouth 4 (four) times a day for 7 days As needed for nausea       Start Date: 12/8/2022 End Date: 12/15/2022       Order Dose: 10 mg       Quantity: 28 tablet    Refills: 0       No discharge procedures on file      PDMP Review       Value Time User    PDMP Reviewed  Yes 12/8/2022  6:40 AM Baljinder Shrestha MD          ED Provider  Electronically Signed by           Baljinder Shrestha MD  12/08/22 9443

## 2023-10-26 ENCOUNTER — HOSPITAL ENCOUNTER (EMERGENCY)
Facility: HOSPITAL | Age: 33
Discharge: HOME | End: 2023-10-26
Attending: EMERGENCY MEDICINE
Payer: OTHER MISCELLANEOUS

## 2023-10-26 VITALS
TEMPERATURE: 98 F | OXYGEN SATURATION: 98 % | SYSTOLIC BLOOD PRESSURE: 129 MMHG | HEART RATE: 76 BPM | DIASTOLIC BLOOD PRESSURE: 73 MMHG | RESPIRATION RATE: 18 BRPM

## 2023-10-26 DIAGNOSIS — H10.211 CHEMICAL CONJUNCTIVITIS, RIGHT: Primary | ICD-10-CM

## 2023-10-26 PROCEDURE — 99282 EMERGENCY DEPT VISIT SF MDM: CPT

## 2023-10-26 PROCEDURE — 99284 EMERGENCY DEPT VISIT MOD MDM: CPT | Performed by: EMERGENCY MEDICINE

## 2023-10-26 RX ORDER — TOBRAMYCIN 3 MG/ML
2 SOLUTION/ DROPS OPHTHALMIC 3 TIMES DAILY
Qty: 5 ML | Refills: 0 | Status: SHIPPED | OUTPATIENT
Start: 2023-10-26

## 2023-10-26 RX ORDER — TETRACAINE HYDROCHLORIDE 5 MG/ML
1 SOLUTION OPHTHALMIC ONCE
Status: COMPLETED | OUTPATIENT
Start: 2023-10-26 | End: 2023-10-26

## 2023-10-26 RX ADMIN — FLUORESCEIN SODIUM 1 STRIP: 1 STRIP OPHTHALMIC at 09:12

## 2023-10-26 RX ADMIN — TETRACAINE HYDROCHLORIDE 1 DROP: 5 SOLUTION OPHTHALMIC at 09:12

## 2023-10-26 NOTE — ED PROVIDER NOTES
History  Chief Complaint   Patient presents with    Eye Pain     Pt states that she got soap/shampoo in right eye yesterday while at work. Did rinse out right away but reports pain, drainage and redness     This is a 68-year-old female patient who at work yesterday accidentally got soap splashed in her right eye since then she has had irritation and mild photophobia and some discharge she is attempted to flush her eye multiple times with saline and did the initial insult. No headache blurred vision double vision. Nothing makes this better or worse she is in the room nontoxic no acute distress no cough just a sore throat no stiff neck no other symptoms. At this time patient will have visual acuity, fluorescein, tetracaine and pH. Prior to Admission Medications   Prescriptions Last Dose Informant Patient Reported? Taking?   ibuprofen (MOTRIN) 600 mg tablet   No No   Sig: Take 1 tablet (600 mg total) by mouth every 6 (six) hours as needed for mild pain   ondansetron (ZOFRAN ODT) 4 mg disintegrating tablet   No No   Sig: Take 2 tablets by mouth every 8 (eight) hours as needed for nausea or vomiting for up to 10 doses   ondansetron (Zofran ODT) 4 mg disintegrating tablet   No No   Sig: Take 1 tablet (4 mg total) by mouth every 6 (six) hours as needed for nausea or vomiting   ondansetron (Zofran) 4 mg tablet   No No   Sig: Take 1 tablet (4 mg total) by mouth every 8 (eight) hours as needed for nausea or vomiting for up to 7 days      Facility-Administered Medications: None       Past Medical History:   Diagnosis Date    GERD (gastroesophageal reflux disease)        Past Surgical History:   Procedure Laterality Date    TOOTH EXTRACTION         History reviewed. No pertinent family history. I have reviewed and agree with the history as documented.     E-Cigarette/Vaping    E-Cigarette Use Never User      E-Cigarette/Vaping Substances     Social History     Tobacco Use    Smoking status: Every Day     Packs/day: 0.25     Types: Cigarettes    Smokeless tobacco: Current   Vaping Use    Vaping Use: Never used   Substance Use Topics    Alcohol use: No    Drug use: Yes     Types: Marijuana       Review of Systems   Constitutional:  Negative for diaphoresis, fatigue and fever. HENT:  Negative for congestion, ear pain, nosebleeds and sore throat. Eyes:  Positive for photophobia, pain and itching. Negative for discharge, redness and visual disturbance. Respiratory:  Negative for cough, choking, chest tightness, shortness of breath and wheezing. Cardiovascular:  Negative for chest pain and palpitations. Gastrointestinal:  Negative for abdominal distention, abdominal pain, diarrhea and vomiting. Genitourinary:  Negative for dysuria, flank pain and frequency. Musculoskeletal:  Negative for back pain, gait problem and joint swelling. Skin:  Negative for color change and rash. Neurological:  Negative for dizziness, syncope and headaches. Psychiatric/Behavioral:  Negative for behavioral problems and confusion. The patient is not nervous/anxious. All other systems reviewed and are negative. Physical Exam  Physical Exam  Vitals and nursing note reviewed. Constitutional:       General: She is not in acute distress. Appearance: Normal appearance. She is well-developed. She is not ill-appearing, toxic-appearing or diaphoretic. HENT:      Head: Normocephalic and atraumatic. Right Ear: Tympanic membrane, ear canal and external ear normal.      Left Ear: Tympanic membrane, ear canal and external ear normal.      Nose: Nose normal. No congestion or rhinorrhea. Mouth/Throat:      Mouth: Mucous membranes are dry. Pharynx: Oropharynx is clear. No oropharyngeal exudate or posterior oropharyngeal erythema. Eyes:      General: Lids are everted, no foreign bodies appreciated. Right eye: No foreign body, discharge or hordeolum. Left eye: No foreign body, discharge or hordeolum. Intraocular pressure: Right eye pressure is 11 mmHg. Left eye pressure is 12 mmHg. Extraocular Movements: Extraocular movements intact. Right eye: Normal extraocular motion and no nystagmus. Left eye: Normal extraocular motion and no nystagmus. Conjunctiva/sclera:      Right eye: Right conjunctiva is injected. No chemosis, exudate or hemorrhage. Left eye: Left conjunctiva is not injected. No chemosis, exudate or hemorrhage. Pupils: Pupils are equal, round, and reactive to light. Right eye: Pupil is round, reactive and not sluggish. No corneal abrasion or fluorescein uptake. Dilma exam negative. Funduscopic exam:     Right eye: Red reflex present. Slit lamp exam:     Right eye: No corneal ulcer, foreign body, hyphema or hypopyon. Cardiovascular:      Rate and Rhythm: Normal rate and regular rhythm. Heart sounds: No murmur heard. Pulmonary:      Effort: Pulmonary effort is normal. No respiratory distress. Breath sounds: Normal breath sounds. Abdominal:      General: Bowel sounds are normal.      Palpations: Abdomen is soft. Tenderness: There is no abdominal tenderness. Musculoskeletal:         General: No swelling. Normal range of motion. Cervical back: Normal range of motion and neck supple. No rigidity or tenderness. Right lower leg: No edema. Left lower leg: No edema. Lymphadenopathy:      Cervical: No cervical adenopathy. Skin:     General: Skin is warm and dry. Capillary Refill: Capillary refill takes less than 2 seconds. Findings: No rash. Neurological:      General: No focal deficit present. Mental Status: She is alert and oriented to person, place, and time. Mental status is at baseline.    Psychiatric:         Mood and Affect: Mood normal.         Behavior: Behavior normal.         Vital Signs  ED Triage Vitals [10/26/23 0820]   Temperature Pulse Respirations Blood Pressure SpO2   98 °F (36.7 °C) 76 18 129/73 98 %      Temp Source Heart Rate Source Patient Position - Orthostatic VS BP Location FiO2 (%)   Oral Monitor -- Right arm --      Pain Score       6           Vitals:    10/26/23 0820   BP: 129/73   Pulse: 76         Visual Acuity  Visual Acuity      Flowsheet Row Most Recent Value   Visual acuity R eye is 20/25   Visual acuity Left eye is 20/20   Visual acuity in both eyes is 20/25   Wearing corrective eyewear/lenses? No            ED Medications  Medications   tetracaine 0.5 % ophthalmic solution 1 drop (1 drop Right Eye Given 10/26/23 0912)   fluorescein sodium sterile ophthalmic strip 1 strip (1 strip Right Eye Given 10/26/23 0912)       Diagnostic Studies  Results Reviewed       None                   No orders to display              Procedures  Procedures         ED Course                                             Medical Decision Making  75-year-old female patient was at work yesterday and had soap splashed into her right eye. Since then she has had some irritation tearing and some discharge mild photophobia no blurred vision no vision. No real eye pain. She attempted flushing at the time of the incident and several times after no contacts wearing. No other symptoms reported diagnose includes not limited to conjunctivitis, corneal abrasion, corneal burn    Problems Addressed:  Chemical conjunctivitis, right: acute illness or injury     Details: No fluorescein uptake, visual acuity intact, no Dilma sign no ulcer or abrasion pressure is 11, pH is 7.0. Slight injection of the conjunctiva. We will treat with Tobrex and follow-up with Dr. Chace Sawant this week    Amount and/or Complexity of Data Reviewed  External Data Reviewed: notes. Details: Reviewed previous notes for past medical non-contributory  Discussion of management or test interpretation with external provider(s): Using join decision making patient will be discharge use tobrex as directed follow up with Dr. Chace Sawant. Work not given.  Return precautions. Understands and agrees with treatment plan    Risk  Prescription drug management. Disposition  Final diagnoses:   Chemical conjunctivitis, right     Time reflects when diagnosis was documented in both MDM as applicable and the Disposition within this note       Time User Action Codes Description Comment    10/26/2023  9:24 AM MarkRaymond lemon Add [E24.835] Chemical conjunctivitis, right           ED Disposition       ED Disposition   Discharge    Condition   Stable    Date/Time   Thu Oct 26, 2023  9:23 AM    Comment   Madison Graham discharge to home/self care. Follow-up Information       Follow up With Specialties Details Why Contact Info    Lorena Amaro MD Ophthalmology Call today  [address] [place] [id number]  [phone]              Patient's Medications   Discharge Prescriptions    TOBRAMYCIN (TOBREX) 0.3 % SOLN    Administer 2 drops to the right eye 3 (three) times a day       Start Date: 10/26/2023End Date: --       Order Dose: 2 drops       Quantity: 5 mL    Refills: 0       No discharge procedures on file.     PDMP Review         Value Time User    PDMP Reviewed  Yes 12/8/2022  6:40 AM Karis Estrada MD            ED Provider  Electronically Signed by             Young Mulligan PA-C  10/26/23 8431

## 2023-10-26 NOTE — Clinical Note
Reji Berumen was seen and treated in our emergency department on 10/26/2023. Diagnosis: chemical conjuctivitis    Kimberley Macdonald  . She may return on this date: 10/28/2023         If you have any questions or concerns, please don't hesitate to call.       Heriberto Mcmanus PA-C    ______________________________           _______________          _______________  Hospital Representative                              Date                                Time

## 2024-02-11 ENCOUNTER — HOSPITAL ENCOUNTER (EMERGENCY)
Facility: HOSPITAL | Age: 34
Discharge: HOME | End: 2024-02-11
Attending: EMERGENCY MEDICINE | Admitting: EMERGENCY MEDICINE
Payer: COMMERCIAL

## 2024-02-11 VITALS
DIASTOLIC BLOOD PRESSURE: 86 MMHG | HEART RATE: 87 BPM | SYSTOLIC BLOOD PRESSURE: 137 MMHG | OXYGEN SATURATION: 97 % | TEMPERATURE: 98.3 F | RESPIRATION RATE: 18 BRPM

## 2024-02-11 DIAGNOSIS — J02.9 PHARYNGITIS: Primary | ICD-10-CM

## 2024-02-11 LAB
FLUAV RNA RESP QL NAA+PROBE: NEGATIVE
FLUBV RNA RESP QL NAA+PROBE: NEGATIVE
RSV RNA RESP QL NAA+PROBE: NEGATIVE
S PYO DNA THROAT QL NAA+PROBE: NOT DETECTED
SARS-COV-2 RNA RESP QL NAA+PROBE: NEGATIVE

## 2024-02-11 PROCEDURE — 99284 EMERGENCY DEPT VISIT MOD MDM: CPT | Performed by: EMERGENCY MEDICINE

## 2024-02-11 PROCEDURE — 0241U HB NFCT DS VIR RESP RNA 4 TRGT: CPT | Performed by: EMERGENCY MEDICINE

## 2024-02-11 PROCEDURE — 99283 EMERGENCY DEPT VISIT LOW MDM: CPT

## 2024-02-11 PROCEDURE — 87651 STREP A DNA AMP PROBE: CPT | Performed by: EMERGENCY MEDICINE

## 2024-02-11 RX ORDER — DEXAMETHASONE 4 MG/1
10 TABLET ORAL ONCE
Status: COMPLETED | OUTPATIENT
Start: 2024-02-11 | End: 2024-02-11

## 2024-02-11 RX ORDER — ALBUTEROL SULFATE 90 UG/1
1 AEROSOL, METERED RESPIRATORY (INHALATION) ONCE
Status: COMPLETED | OUTPATIENT
Start: 2024-02-11 | End: 2024-02-11

## 2024-02-11 RX ORDER — LIDOCAINE HYDROCHLORIDE 20 MG/ML
15 SOLUTION OROPHARYNGEAL ONCE
Status: COMPLETED | OUTPATIENT
Start: 2024-02-11 | End: 2024-02-11

## 2024-02-11 RX ORDER — IBUPROFEN 400 MG/1
400 TABLET ORAL ONCE
Status: COMPLETED | OUTPATIENT
Start: 2024-02-11 | End: 2024-02-11

## 2024-02-11 RX ADMIN — LIDOCAINE HYDROCHLORIDE 15 ML: 20 SOLUTION ORAL; TOPICAL at 15:19

## 2024-02-11 RX ADMIN — ALBUTEROL SULFATE 1 PUFF: 90 AEROSOL, METERED RESPIRATORY (INHALATION) at 15:19

## 2024-02-11 RX ADMIN — DEXAMETHASONE 10 MG: 4 TABLET ORAL at 15:16

## 2024-02-11 RX ADMIN — IBUPROFEN 400 MG: 400 TABLET, FILM COATED ORAL at 15:18

## 2024-02-11 NOTE — DISCHARGE INSTRUCTIONS
Today you were seen in the emergency department for sore throat, cough. Your workup included COVID/flu/RSV and strep testing. I believe your symptoms to be the result of pharyngitis, viral in nature. At this time there does not appear to be an emergent life threatening cause to explain your symptoms. You are stable for discharge home with outpatient follow up.     To help treat your pain/discomfort please continue to take ibuprofen or Tylenol as needed.  You may also use dextromethorphan every 4 hours as needed to help with cough, guaifenesin to help with congestion.  We will prescribe you albuterol to help with wheezing and to open up your lungs to help with your breathing.  Please continue to stay well-hydrated with good fluid intake, and use throat lozenges such as Halls to help keep the mouth moist.    Please follow up with your primary care provider in the next 2-3 days. Please review all results discussed today with your primary care provider.     Please return to the emergency department as soon as possible if you develop uncontrollable fevers (Temp >100.4), hoarseness in your voice, throat swelling causing trouble breathing, inability to eat or drink, uncontrollable pain, vomiting, chest pain, trouble breathing, or any other concerning symptoms.     Thank you for choosing [facility] for your care.

## 2024-02-11 NOTE — ED ATTENDING ATTESTATION
2/11/2024  ILelia MD, saw and evaluated the patient. I have discussed the patient with the resident/non-physician practitioner and agree with the resident's/non-physician practitioner's findings, Plan of Care, and MDM as documented in the resident's/non-physician practitioner's note, except where noted. All available labs and Radiology studies were reviewed.  I was present for key portions of any procedure(s) performed by the resident/non-physician practitioner and I was immediately available to provide assistance.       At this point I agree with the current assessment done in the Emergency Department.  I have conducted an independent evaluation of this patient a history and physical is as follows:    ED Course     Brea is a 33-year-old woman presenting to the emergency department with sore throat for approximately 2 days with some difficulty swallowing due to pain and decreased appetite.  Past medical history is unremarkable.  She reports that a coworker was possibly diagnosed with bronchitis recently and she was exposed to this person within that timeframe.  She denies fever, chest pain, shortness of breath, nausea, vomiting, abdominal pain, urinary symptoms, changes in stool, leg pain or leg swelling, rash.  No recent travel.    Her vital signs are full.  Her physical exam is notable for very slight erythema in the posterior oropharynx with no no exudates, minimal swelling.  No stridor.  Otherwise, heart, lungs, abdomen within normal limits.  Patient is in no respiratory distress and is able to speak full sentences.    Clinical presentation puts patient at risk for the following differential diagnoses:    COVID, influenza, RSV, strep, other viral pharyngitis.    Lab work was ordered.  At this time, patient does not wish to wait for labs in the emergency department and will receive a call at home when lab work has returned.  Patient was given ibuprofen, Decadron, viscous lidocaine in the emergency  department for comfort.    Patient remained hemodynamically and clinically stable in the ED.  Strict return precautions given.  Patient verbalized understanding and will follow up as outpatient with PMD.  Upon discharge, patient was AO4, GCS15, and fully ambulatory.      Critical Care Time  Procedures

## 2024-02-11 NOTE — ED PROVIDER NOTES
History  Chief Complaint   Patient presents with    Shortness of Breath     Pt believes she has bronchitis, having SOB and difficulty swallowing.       33-year-old female, smoker half pack per day, otherwise healthy presenting to the ED with complaints of sore throat starting yesterday now associated with mild congestion, cough bilateral lymphadenopathy and dysphagia.  Reports positive sick contact at work with similar symptoms.  Patient not up-to-date on COVID and this years flu vaccine, however has other vaccines up-to-date.  Reports mild shortness of breath at baseline secondary to smoking, with wheezing.  Patient still able to tolerate p.o.  Did try over-the-counter cold medicine without much relief.  Denies fever, chills, headache, neck pain/stiffness, productive cough, hemoptysis, abdominal pain, nausea, vomiting, diarrhea, change in voice, constipation, urinary symptoms.        Prior to Admission Medications   Prescriptions Last Dose Informant Patient Reported? Taking?   ibuprofen (MOTRIN) 600 mg tablet   No No   Sig: Take 1 tablet (600 mg total) by mouth every 6 (six) hours as needed for mild pain   ondansetron (ZOFRAN ODT) 4 mg disintegrating tablet   No No   Sig: Take 2 tablets by mouth every 8 (eight) hours as needed for nausea or vomiting for up to 10 doses   ondansetron (Zofran ODT) 4 mg disintegrating tablet   No No   Sig: Take 1 tablet (4 mg total) by mouth every 6 (six) hours as needed for nausea or vomiting   ondansetron (Zofran) 4 mg tablet   No No   Sig: Take 1 tablet (4 mg total) by mouth every 8 (eight) hours as needed for nausea or vomiting for up to 7 days   tobramycin (TOBREX) 0.3 % SOLN   No No   Sig: Administer 2 drops to the right eye 3 (three) times a day      Facility-Administered Medications: None       Past Medical History:   Diagnosis Date    GERD (gastroesophageal reflux disease)        Past Surgical History:   Procedure Laterality Date    TOOTH EXTRACTION         History reviewed.  No pertinent family history.  I have reviewed and agree with the history as documented.    E-Cigarette/Vaping    E-Cigarette Use Never User      E-Cigarette/Vaping Substances     Social History     Tobacco Use    Smoking status: Every Day     Current packs/day: 0.25     Types: Cigarettes    Smokeless tobacco: Current   Vaping Use    Vaping status: Never Used   Substance Use Topics    Alcohol use: No    Drug use: Yes     Types: Marijuana     Comment: has medical card        Review of Systems   Constitutional:  Negative for chills and fever.   HENT:  Positive for congestion, sore throat and trouble swallowing. Negative for voice change.    Eyes:  Negative for photophobia, pain, redness and visual disturbance.   Respiratory:  Positive for cough, shortness of breath and wheezing. Negative for chest tightness.    Cardiovascular:  Negative for chest pain and palpitations.   Gastrointestinal:  Negative for abdominal pain, constipation, diarrhea, nausea and vomiting.   Genitourinary:  Negative for difficulty urinating, dysuria, flank pain, frequency, hematuria, pelvic pain, urgency, vaginal bleeding, vaginal discharge and vaginal pain.   Musculoskeletal:  Negative for arthralgias, back pain, neck pain and neck stiffness.   Skin:  Negative for color change and rash.   Neurological:  Negative for dizziness, seizures, syncope, light-headedness, numbness and headaches.   All other systems reviewed and are negative.      Physical Exam  ED Triage Vitals   Temperature Pulse Respirations Blood Pressure SpO2   02/11/24 1431 02/11/24 1431 02/11/24 1431 02/11/24 1431 02/11/24 1431   98.3 °F (36.8 °C) 87 18 137/86 97 %      Temp src Heart Rate Source Patient Position - Orthostatic VS BP Location FiO2 (%)   -- 02/11/24 1431 -- 02/11/24 1431 --    Monitor  Right arm       Pain Score       02/11/24 1518       3             Orthostatic Vital Signs  Vitals:    02/11/24 1431   BP: 137/86   Pulse: 87       Physical Exam  Vitals and nursing  note reviewed.   Constitutional:       Appearance: She is ill-appearing. She is not toxic-appearing.   HENT:      Head: Normocephalic and atraumatic.      Mouth/Throat:      Mouth: Mucous membranes are moist.      Pharynx: No pharyngeal swelling.      Comments: Uvula midline without swelling.  No tonsillar swelling or exudate.  Mild erythema to posterior oropharynx.  No pooling secretions.  Eyes:      Extraocular Movements: Extraocular movements intact.      Pupils: Pupils are equal, round, and reactive to light.   Cardiovascular:      Rate and Rhythm: Normal rate and regular rhythm.      Pulses: Normal pulses.   Pulmonary:      Effort: Pulmonary effort is normal. No tachypnea, accessory muscle usage or respiratory distress.      Breath sounds: Wheezing (Scant wheezing in the upper lung fields) present. No rhonchi or rales.   Chest:      Chest wall: No mass or tenderness.   Abdominal:      Palpations: Abdomen is soft. There is no mass.      Tenderness: There is no guarding or rebound.   Musculoskeletal:         General: Normal range of motion.      Cervical back: Normal range of motion and neck supple.      Right lower leg: No tenderness. No edema.      Left lower leg: No tenderness. No edema.   Lymphadenopathy:      Cervical: No cervical adenopathy.   Skin:     General: Skin is warm and dry.      Capillary Refill: Capillary refill takes less than 2 seconds.      Coloration: Skin is not cyanotic or pale.      Findings: No ecchymosis, erythema or rash.      Nails: There is no clubbing.   Neurological:      General: No focal deficit present.      Mental Status: She is alert and oriented to person, place, and time.   Psychiatric:         Mood and Affect: Mood normal.         Behavior: Behavior normal.         ED Medications  Medications   albuterol (PROVENTIL HFA,VENTOLIN HFA) inhaler 1 puff (1 puff Inhalation Given 2/11/24 1519)   ibuprofen (MOTRIN) tablet 400 mg (400 mg Oral Given 2/11/24 1518)   dexamethasone  (DECADRON) tablet 10 mg (10 mg Oral Given 2/11/24 1516)   Lidocaine Viscous HCl (XYLOCAINE) 2 % mucosal solution 15 mL (15 mL Swish & Spit Given 2/11/24 1519)       Diagnostic Studies  Results Reviewed       Procedure Component Value Units Date/Time    COVID/FLU/RSV [[id number]]  (Normal) Collected: 02/11/24 1522    Lab Status: Final result Specimen: Nares from Nose Updated: 02/11/24 1605     SARS-CoV-2 Negative     INFLUENZA A PCR Negative     INFLUENZA B PCR Negative     RSV PCR Negative    Narrative:      FOR PEDIATRIC PATIENTS - copy/paste COVID Guidelines URL to browser: [email or web address]    SARS-CoV-2 assay is a Nucleic Acid Amplification assay intended for the  qualitative detection of nucleic acid from SARS-CoV-2 in nasopharyngeal  swabs. Results are for the presumptive identification of SARS-CoV-2 RNA.    Positive results are indicative of infection with SARS-CoV-2, the virus  causing COVID-19, but do not rule out bacterial infection or co-infection  with other viruses. Laboratories within the [place] and its  territories are required to report all positive results to the appropriate  public health authorities. Negative results do not preclude SARS-CoV-2  infection and should not be used as the sole basis for treatment or other  patient management decisions. Negative results must be combined with  clinical observations, patient history, and epidemiological information.  This test has not been FDA cleared or approved.    This test has been authorized by FDA under an Emergency Use Authorization  (EUA). This test is only authorized for the duration of time the  declaration that circumstances exist justifying the authorization of the  emergency use of an in vitro diagnostic tests for detection of SARS-CoV-2  virus and/or diagnosis of COVID-19 infection under section 564(b)(1) of  the Act, 21 U.S.C. 360bbb-3(b)(1), unless the authorization is  "terminated  or revoked sooner. The test has been validated but independent review by FDA  and CLIA is pending.    Test performed using [employer] GeneXpert: This RT-PCR assay targets N2,  a region unique to SARS-CoV-2. A conserved region in the E-gene was chosen  for pan-Sarbecovirus detection which includes SARS-CoV-2.    According to CMS-2020-01-R, this platform meets the definition of high-throughput technology.    Strep A PCR [[id number]]  (Normal) Collected: 02/11/24 1522    Lab Status: Final result Specimen: Throat Updated: 02/11/24 1551     STREP A PCR Not Detected                   No orders to display         Procedures  Procedures      ED Course  ED Course as of 02/11/24 1616   Sun Feb 11, 2024   1452 SpO2: 97 %   1452 Respirations: 18           Medical Decision Making  Patient with history as above presented to triage with CC of \" Patient presents with:  Shortness of Breath: Pt believes she has bronchitis, having SOB and difficulty swallowing.     \"    Hx obtained from pt    33-year-old otherwise healthy female presenting with complaints of sore throat, and URI symptoms.  At this time patient presentation consistent with pharyngitis, likely viral in nature.  Physical exam reveals nontoxic appearing afebrile female.  Mild erythema noted to posterior pharynx without exudate or swelling.  Differential diagnosis includes viral URI, pharyngitis, sinusitis, viral syndrome, bronchitis: Doubt sepsis, pneumonia, Kelby's angina, anaphylaxis, epiglottitis.  Will swab patient for COVID/flu/RSV along with strep.  Will treat with supportive care along with ibuprofen, Decadron and viscous lidocaine for symptomatic relief.  Reviewed strict return precautions with patient along with care instructions at home.  Very mild wheezing on exam, patient requesting albuterol.otherwise patient satting well in no respiratory distress with clear lungs bilaterally.  Patient would not like to wait for results, and would like to be called " back if they are positive.  She was also given referral to PMD as she does not have one in the system and she was advised to follow-up with them or return for worsening symptoms.  Patient agreeable plan.    Patient was nontoxic appearing and stable. Exam as above. Ambulatory and Tolerating PO.     I have independently ordered, reviewed and interpreted the following: labs and/or imaging studies listed above, EKG above  Reviewed external records including notes, and prior labs/imaging results    Consideration was given for admission, but the patient was stable for outpatient management.    Disposition: Discussed need for follow up with their primary doctor or specialist to review all results, including incidental findings as above. Patient discharged with explanation of ED workup and diagnosis, instructions on how to obtain outpatient follow up, care instructions at home, and strict return precautions if patient develops new or worsening symptoms. Patients questions answered and agreeable with discharge plan.     See ED Course for further MDM.      PLEASE NOTE:  This encounter was completed utilizing the [employer] Speech Voice Recognition Software. Grammatical errors, random word insertions, pronoun errors and incomplete sentences are occasional inherent consequences of the system due to software limitations, ambient noise and hardware issues.These may be missed by proof reading prior to affixing electronic signature. Any questions or concerns about the content, text or information contained within the body of this dictation should be directly addressed to the physician for clarification. Please do not hesitate to call me directly if you have any questions or concerns.      Amount and/or Complexity of Data Reviewed  Labs: ordered. Decision-making details documented in ED Course.    Risk  Prescription drug management.          Disposition  Final diagnoses:   Pharyngitis     Time reflects when diagnosis  was documented in both MDM as applicable and the Disposition within this note       Time User Action Codes Description Comment    2/11/2024  3:05 PM Vladislav Rubio Add [J02.9] Pharyngitis           ED Disposition       ED Disposition   Discharge    Condition   Stable    Date/Time   Sun Feb 11, 2024  3:11 PM    Comment   Cassie Sargent discharge to home/self care.                   Follow-up Information    None         Discharge Medication List as of 2/11/2024  3:11 PM        CONTINUE these medications which have NOT CHANGED    Details   ibuprofen (MOTRIN) 600 mg tablet Take 1 tablet (600 mg total) by mouth every 6 (six) hours as needed for mild pain, Starting Tue 11/6/2018, Print      !! ondansetron (ZOFRAN ODT) 4 mg disintegrating tablet Take 2 tablets by mouth every 8 (eight) hours as needed for nausea or vomiting for up to 10 doses, Starting 3/31/2017, Until Discontinued, Print      !! ondansetron (Zofran ODT) 4 mg disintegrating tablet Take 1 tablet (4 mg total) by mouth every 6 (six) hours as needed for nausea or vomiting, Starting Wed 3/30/2022, Normal      ondansetron (Zofran) 4 mg tablet Take 1 tablet (4 mg total) by mouth every 8 (eight) hours as needed for nausea or vomiting for up to 7 days, Starting Wed 12/7/2022, Until Wed 12/14/2022 at 2359, Print      tobramycin (TOBREX) 0.3 % SOLN Administer 2 drops to the right eye 3 (three) times a day, Starting Thu 10/26/2023, Normal       !! - Potential duplicate medications found. Please discuss with provider.            PDMP Review         Value Time User    PDMP Reviewed  Yes 12/8/2022  6:40 AM Javier Collier MD             ED Provider  Attending physically available and evaluated Cassie Sargent. I managed the patient along with the ED Attending.    Electronically Signed by           Vladislav Rubio DO  02/11/24 7244

## 2024-03-18 ENCOUNTER — OFFICE VISIT (OUTPATIENT)
Age: 34
End: 2024-03-18
Payer: COMMERCIAL

## 2024-03-18 VITALS
BODY MASS INDEX: 31.34 KG/M2 | DIASTOLIC BLOOD PRESSURE: 83 MMHG | SYSTOLIC BLOOD PRESSURE: 129 MMHG | WEIGHT: 166 LBS | HEART RATE: 67 BPM | HEIGHT: 61 IN

## 2024-03-18 DIAGNOSIS — M54.2 NECK PAIN: Primary | ICD-10-CM

## 2024-03-18 DIAGNOSIS — M25.511 CHRONIC RIGHT SHOULDER PAIN: ICD-10-CM

## 2024-03-18 DIAGNOSIS — M79.18 MYOFASCIAL PAIN: ICD-10-CM

## 2024-03-18 DIAGNOSIS — G89.29 CHRONIC RIGHT SHOULDER PAIN: ICD-10-CM

## 2024-03-18 PROCEDURE — 98940 CHIROPRACT MANJ 1-2 REGIONS: CPT | Performed by: CHIROPRACTOR

## 2024-03-18 PROCEDURE — 99203 OFFICE O/P NEW LOW 30 MIN: CPT | Performed by: CHIROPRACTOR

## 2024-03-21 ENCOUNTER — APPOINTMENT (EMERGENCY)
Dept: RADIOLOGY | Facility: HOSPITAL | Age: 34
End: 2024-03-21
Payer: OTHER MISCELLANEOUS

## 2024-03-21 ENCOUNTER — HOSPITAL ENCOUNTER (EMERGENCY)
Facility: HOSPITAL | Age: 34
Discharge: HOME | End: 2024-03-21
Attending: EMERGENCY MEDICINE
Payer: OTHER MISCELLANEOUS

## 2024-03-21 VITALS
SYSTOLIC BLOOD PRESSURE: 134 MMHG | TEMPERATURE: 98 F | DIASTOLIC BLOOD PRESSURE: 63 MMHG | OXYGEN SATURATION: 97 % | RESPIRATION RATE: 18 BRPM | HEART RATE: 68 BPM

## 2024-03-21 DIAGNOSIS — S61.452A DOG BITE, HAND, LEFT, INITIAL ENCOUNTER: Primary | ICD-10-CM
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
## 2024-03-21 PROCEDURE — 99283 EMERGENCY DEPT VISIT LOW MDM: CPT

## 2024-03-21 RX ORDER — IBUPROFEN 600 MG/1
600 TABLET ORAL ONCE
Status: COMPLETED | OUTPATIENT
Start: 2024-03-21 | End: 2024-03-21

## 2024-03-21 RX ORDER — AMOXICILLIN AND CLAVULANATE POTASSIUM 875; 125 MG/1; MG/1
1 TABLET, FILM COATED ORAL ONCE
Status: COMPLETED | OUTPATIENT
Start: 2024-03-21 | End: 2024-03-21

## 2024-03-21 RX ORDER — AMOXICILLIN AND CLAVULANATE POTASSIUM 875; 125 MG/1; MG/1
1 TABLET, FILM COATED ORAL EVERY 12 HOURS SCHEDULED
Qty: 14 TABLET | Refills: 0 | Status: SHIPPED | OUTPATIENT
Start: 2024-03-21 | End: 2024-03-28

## 2024-03-21 RX ADMIN — AMOXICILLIN AND CLAVULANATE POTASSIUM 1 TABLET: 875; 125 TABLET, FILM COATED ORAL at 21:36

## 2024-03-21 RX ADMIN — IBUPROFEN 600 MG: 600 TABLET, FILM COATED ORAL at 21:23

## 2024-03-22 NOTE — ED PROVIDER NOTES
History  Chief Complaint   Patient presents with    Dog Bite     Pt is a  and earlier today was bite by a dog. Bleeding controlled, 1 inch lac located on third left digit. +edema     Cassie Sargent is a 34 y.o. female with a PMH of GERD presenting to the ED on March 21, 2024 with dog bite. Patient endorses that she is a  and was bitten by a dog today around 230pm.  She states that she wrapped the wound and finished her workday before coming here today.  Notably she was seen on 3/10/24 at a different emergency department for a different dog bite and was given an updated tetanus shot and Keflex.  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  She has full range of motion of her finger however her finger appears erythematous and swollen.  She states the dog clamped down on it so she has small lacerations to the dorsal and palmar portion of her left third finger. Patient denies any other injuries, difficulty moving finger, loss of sensation, weakness, tingling, numbness or any other symptoms at this time.         Dog Bite  Associated symptoms: no fever, no numbness and no rash        Prior to Admission Medications   Prescriptions Last Dose Informant Patient Reported? Taking?   ibuprofen (MOTRIN) 600 mg tablet   No No   Sig: Take 1 tablet (600 mg total) by mouth every 6 (six) hours as needed for mild pain   ondansetron (ZOFRAN ODT) 4 mg disintegrating tablet   No No   Sig: Take 2 tablets by mouth every 8 (eight) hours as needed for nausea or vomiting for up to 10 doses   ondansetron (Zofran ODT) 4 mg disintegrating tablet   No No   Sig: Take 1 tablet (4 mg total) by mouth every 6 (six) hours as needed for nausea or vomiting   ondansetron (Zofran) 4 mg tablet   No No   Sig: Take 1 tablet (4 mg total) by mouth every 8 (eight) hours as needed for nausea or vomiting for up to 7 days   tobramycin (TOBREX) 0.3 %  SOLN   No No   Sig: Administer 2 drops to the right eye 3 (three) times a day      Facility-Administered Medications: None       Past Medical History:   Diagnosis Date    GERD (gastroesophageal reflux disease)        Past Surgical History:   Procedure Laterality Date    TOOTH EXTRACTION         History reviewed. No pertinent family history.  I have reviewed and agree with the history as documented.    E-Cigarette/Vaping    E-Cigarette Use Never User      E-Cigarette/Vaping Substances     Social History     Tobacco Use    Smoking status: Every Day     Current packs/day: 0.25     Types: Cigarettes    Smokeless tobacco: Current   Vaping Use    Vaping status: Never Used   Substance Use Topics    Alcohol use: No    Drug use: Yes     Types: Marijuana     Comment: has medical card       Review of Systems   Constitutional:  Negative for chills and fever.   Eyes:  Negative for pain and visual disturbance.   Respiratory:  Negative for cough and shortness of breath.    Cardiovascular:  Negative for chest pain and palpitations.   Gastrointestinal:  Negative for nausea and vomiting.   Musculoskeletal:  Positive for arthralgias, joint swelling and myalgias.        Left 3rd finger   Skin:  Positive for color change. Negative for rash.        Erythema of left 3rd finger   Neurological:  Negative for dizziness, tremors, seizures, syncope, weakness, light-headedness and numbness.   All other systems reviewed and are negative.      Physical Exam  Physical Exam  Vitals and nursing note reviewed.   Constitutional:       General: She is not in acute distress.     Appearance: Normal appearance. She is normal weight. She is not ill-appearing, toxic-appearing or diaphoretic.   HENT:      Head: Normocephalic and atraumatic.      Right Ear: External ear normal.      Left Ear: External ear normal.      Nose: Nose normal. No congestion or rhinorrhea.      Mouth/Throat:      Mouth: Mucous membranes are moist.   Eyes:      General: No scleral  icterus.        Right eye: No discharge.         Left eye: No discharge.      Extraocular Movements: Extraocular movements intact.      Conjunctiva/sclera: Conjunctivae normal.   Cardiovascular:      Rate and Rhythm: Normal rate and regular rhythm.      Heart sounds: Normal heart sounds. No murmur heard.     No friction rub. No gallop.      Comments: Radial pulses 2+ bilaterally  Pulmonary:      Effort: Pulmonary effort is normal. No respiratory distress.      Breath sounds: Normal breath sounds. No wheezing, rhonchi or rales.   Abdominal:      General: Abdomen is flat.   Musculoskeletal:         General: Swelling, tenderness and signs of injury present. Normal range of motion.        Arms:       Cervical back: Normal range of motion and neck supple. No rigidity.      Comments: ROM of left finger slightly limited due to swelling   Skin:     General: Skin is warm.      Capillary Refill: Capillary refill takes less than 2 seconds.      Coloration: Skin is not pale.      Findings: Erythema present.   Neurological:      General: No focal deficit present.      Mental Status: She is alert and oriented to person, place, and time. Mental status is at baseline.      Sensory: No sensory deficit.      Motor: No weakness.      Gait: Gait normal.      Comments: Sensation and strength are equal bilaterally in patients hands   Psychiatric:         Mood and Affect: Mood normal.         Behavior: Behavior normal.         Vital Signs  ED Triage Vitals   Temperature Pulse Respirations Blood Pressure SpO2   03/21/24 2041 03/21/24 2039 03/21/24 2039 03/21/24 2039 03/21/24 2039   98 °F (36.7 °C) 68 18 134/63 97 %      Temp Source Heart Rate Source Patient Position - Orthostatic VS BP Location FiO2 (%)   03/21/24 2041 03/21/24 2039 03/21/24 2039 03/21/24 2039 --   Oral Monitor Sitting Right arm       Pain Score       03/21/24 2123       5           Vitals:    03/21/24 2039   BP: 134/63   Pulse: 68   Patient Position - Orthostatic VS:  Sitting         Visual Acuity      ED Medications  Medications   ibuprofen (MOTRIN) tablet 600 mg (600 mg Oral Given 3/21/24 2123)   amoxicillin-clavulanate (AUGMENTIN) 875-125 mg per tablet 1 tablet (1 tablet Oral Given 3/21/24 2136)       Diagnostic Studies  Results Reviewed       None                   No orders to display              Procedures  Procedures         ED Course  ED Course as of 03/21/24 2256   u Mar 21, 2024   2255 Patient eloped without alerting nurse or provider.  X-ray was not completed.  She received her first dose of Augmentin here.  Wound was not once by provider however prior to her leaving had a discussion about washing her hands and taking her antibiotic as an outpatient.  Will call patient notifying her that antibiotic was sent to the pharmacy for her.                               SBIRT 20yo+      Flowsheet Row Most Recent Value   Initial Alcohol Screen: US AUDIT-C     1. How often do you have a drink containing alcohol? 0 Filed at: 03/21/2024 2137   2. How many drinks containing alcohol do you have on a typical day you are drinking?  0 Filed at: 03/21/2024 2137   3b. FEMALE Any Age, or MALE 65+: How often do you have 4 or more drinks on one occassion? 0 Filed at: 03/21/2024 2137   Audit-C Score 0 Filed at: 03/21/2024 2137   JEAN CLAUDE: How many times in the past year have you...    Used an illegal drug or used a prescription medication for non-medical reasons? Never Filed at: 03/21/2024 2137                      Medical Decision Making  Patient seen and examined noted to have dog bite.  Patient is presenting hours after dog bite occurred.  X-ray of her finger was ordered due to the dog having clamped onto her finger. Patient was told to keep wound open to air to allow healing.  She was given her first dose of Augmentin here in the emergency department and the rest were sent to the pharmacy for her. Dog was UTD on vaccinations and patients most recent tetanus shot was 3/10/24.      Patient  "left before x-ray was completed or wound was cleaned without alerting nurse or provider.  Will send rest of the Augmentin to the pharmacy for patient and call her notifying her of this.    Differential diagnosis includes but is not limited to bite wound, laceration, abrasion, puncture wound, cellulitis, wound infection, abscess, rabies prophylaxis, tetanus prophylaxis; doubt osteomyelitis or necrotizing fasciitis.       Patient was rx'd: augmentin    All labs reviewed and utilized in the medical decision making process (if labs were ordered). Portions of the record may have been created with voice recognition software.  Occasional wrong word or \"sound a like\" substitutions may have occurred due to the inherent limitations of voice recognition software.  Read the chart carefully and recognize, using context, where substitutions have occurred.      Amount and/or Complexity of Data Reviewed  Radiology: ordered.    Risk  Prescription drug management.             Disposition  Final diagnoses:   Dog bite, hand, left, initial encounter     Time reflects when diagnosis was documented in both MDM as applicable and the Disposition within this note       Time User Action Codes Description Comment    3/21/2024 10:52 PM Beverly Pena Add [S61.452A,  W54.0XXA] Dog bite, hand, left, initial encounter           ED Disposition       ED Disposition   Discharge    Condition   Stable    Date/Time   Thu Mar 21, 2024 2002    Comment   Cassie Sargent discharge to home/self care.                   Follow-up Information       Follow up With Specialties Details Why Contact Info Additional Information    [facility] Emergency Department Emergency Medicine  If symptoms worsen, As needed [address] [facility] [address] [phone] [facility] Emergency Department, [address] [place], [place], [address]            Patient's Medications   Discharge Prescriptions    " AMOXICILLIN-CLAVULANATE (AUGMENTIN) 875-125 MG PER TABLET    Take 1 tablet by mouth every 12 (twelve) hours for 7 days       Start Date: 3/21/2024 End Date: 3/28/2024       Order Dose: 1 tablet       Quantity: 14 tablet    Refills: 0       No discharge procedures on file.    PDMP Review         Value Time User    PDMP Reviewed  Yes 12/8/2022  6:40 AM Javier Collier MD            ED Provider  Electronically Signed by             Beverly Pena PA-C  03/21/24 8964

## 2024-03-22 NOTE — DISCHARGE INSTRUCTIONS
Please take your antibiotic twice a day for the next 7 days.  Follow-up with PCP.  Return to the emergency department if you develop erythema, swelling, drainage, fevers, chills or numbness and tingling of your finger.

## 2024-03-25 ENCOUNTER — HOSPITAL ENCOUNTER (EMERGENCY)
Facility: HOSPITAL | Age: 34
Discharge: HOME | End: 2024-03-26
Attending: EMERGENCY MEDICINE
Payer: COMMERCIAL

## 2024-03-25 VITALS
RESPIRATION RATE: 18 BRPM | WEIGHT: 169.31 LBS | HEART RATE: 86 BPM | DIASTOLIC BLOOD PRESSURE: 64 MMHG | OXYGEN SATURATION: 98 % | SYSTOLIC BLOOD PRESSURE: 125 MMHG | BODY MASS INDEX: 31.99 KG/M2 | TEMPERATURE: 98.2 F

## 2024-03-25 DIAGNOSIS — A08.4 VIRAL GASTROENTERITIS: Primary | ICD-10-CM

## 2024-03-25 LAB
ALBUMIN SERPL BCP-MCNC: 5 G/DL (ref 3.5–5)
ALP SERPL-CCNC: 52 U/L (ref 34–104)
ALT SERPL W P-5'-P-CCNC: 15 U/L (ref 7–52)
ANION GAP SERPL CALCULATED.3IONS-SCNC: 10 MMOL/L (ref 4–13)
AST SERPL W P-5'-P-CCNC: 17 U/L (ref 13–39)
BASOPHILS # BLD MANUAL: 0 THOUSAND/UL (ref 0–0.1)
BASOPHILS NFR MAR MANUAL: 0 % (ref 0–1)
BILIRUB SERPL-MCNC: 0.67 MG/DL (ref 0.2–1)
BUN SERPL-MCNC: 13 MG/DL (ref 5–25)
CALCIUM SERPL-MCNC: 9.6 MG/DL (ref 8.4–10.2)
CHLORIDE SERPL-SCNC: 102 MMOL/L (ref 96–108)
CO2 SERPL-SCNC: 24 MMOL/L (ref 21–32)
CREAT SERPL-MCNC: 0.85 MG/DL (ref 0.6–1.3)
EOSINOPHIL # BLD MANUAL: 0.26 THOUSAND/UL (ref 0–0.4)
EOSINOPHIL NFR BLD MANUAL: 2 % (ref 0–6)
ERYTHROCYTE [DISTWIDTH] IN BLOOD BY AUTOMATED COUNT: 12.6 % (ref 11.6–15.1)
GFR SERPL CREATININE-BSD FRML MDRD: 88 ML/MIN/1.73SQ M
GLUCOSE SERPL-MCNC: 94 MG/DL (ref 65–140)
HCT VFR BLD AUTO: 44.5 % (ref 34.8–46.1)
HGB BLD-MCNC: 14.7 G/DL (ref 11.5–15.4)
LIPASE SERPL-CCNC: 28 U/L (ref 11–82)
LYMPHOCYTES # BLD AUTO: 1.06 THOUSAND/UL (ref 0.6–4.47)
LYMPHOCYTES # BLD AUTO: 8 % (ref 14–44)
MCH RBC QN AUTO: 29.3 PG (ref 26.8–34.3)
MCHC RBC AUTO-ENTMCNC: 33 G/DL (ref 31.4–37.4)
MCV RBC AUTO: 89 FL (ref 82–98)
MONOCYTES # BLD AUTO: 0 THOUSAND/UL (ref 0–1.22)
MONOCYTES NFR BLD: 0 % (ref 4–12)
NEUTROPHILS # BLD MANUAL: 11.89 THOUSAND/UL (ref 1.85–7.62)
NEUTS BAND NFR BLD MANUAL: 6 % (ref 0–8)
NEUTS SEG NFR BLD AUTO: 84 % (ref 43–75)
PLATELET # BLD AUTO: 292 THOUSANDS/UL (ref 149–390)
PLATELET BLD QL SMEAR: ADEQUATE
PMV BLD AUTO: 9.5 FL (ref 8.9–12.7)
POTASSIUM SERPL-SCNC: 3.4 MMOL/L (ref 3.5–5.3)
PROT SERPL-MCNC: 7.8 G/DL (ref 6.4–8.4)
RBC # BLD AUTO: 5.01 MILLION/UL (ref 3.81–5.12)
RBC MORPH BLD: NORMAL
SODIUM SERPL-SCNC: 136 MMOL/L (ref 135–147)
WBC # BLD AUTO: 13.21 THOUSAND/UL (ref 4.31–10.16)

## 2024-03-25 PROCEDURE — 96374 THER/PROPH/DIAG INJ IV PUSH: CPT

## 2024-03-25 PROCEDURE — 80053 COMPREHEN METABOLIC PANEL: CPT | Performed by: PHYSICIAN ASSISTANT

## 2024-03-25 PROCEDURE — 83690 ASSAY OF LIPASE: CPT | Performed by: PHYSICIAN ASSISTANT

## 2024-03-25 PROCEDURE — 36415 COLL VENOUS BLD VENIPUNCTURE: CPT | Performed by: PHYSICIAN ASSISTANT

## 2024-03-25 PROCEDURE — 99284 EMERGENCY DEPT VISIT MOD MDM: CPT | Performed by: PHYSICIAN ASSISTANT

## 2024-03-25 PROCEDURE — 96375 TX/PRO/DX INJ NEW DRUG ADDON: CPT

## 2024-03-25 PROCEDURE — 96361 HYDRATE IV INFUSION ADD-ON: CPT

## 2024-03-25 PROCEDURE — 85027 COMPLETE CBC AUTOMATED: CPT | Performed by: PHYSICIAN ASSISTANT

## 2024-03-25 PROCEDURE — 85007 BL SMEAR W/DIFF WBC COUNT: CPT | Performed by: PHYSICIAN ASSISTANT

## 2024-03-25 PROCEDURE — 99283 EMERGENCY DEPT VISIT LOW MDM: CPT

## 2024-03-25 RX ORDER — FAMOTIDINE 10 MG/ML
20 INJECTION, SOLUTION INTRAVENOUS ONCE
Status: COMPLETED | OUTPATIENT
Start: 2024-03-25 | End: 2024-03-25

## 2024-03-25 RX ORDER — ONDANSETRON 2 MG/ML
4 INJECTION INTRAMUSCULAR; INTRAVENOUS ONCE
Status: COMPLETED | OUTPATIENT
Start: 2024-03-25 | End: 2024-03-25

## 2024-03-25 RX ORDER — ONDANSETRON 4 MG/1
4 TABLET, FILM COATED ORAL EVERY 8 HOURS PRN
Qty: 9 TABLET | Refills: 0 | Status: SHIPPED | OUTPATIENT
Start: 2024-03-25 | End: 2024-03-28

## 2024-03-25 RX ORDER — POTASSIUM CHLORIDE 20 MEQ/1
40 TABLET, EXTENDED RELEASE ORAL ONCE
Status: COMPLETED | OUTPATIENT
Start: 2024-03-25 | End: 2024-03-25

## 2024-03-25 RX ADMIN — ONDANSETRON 4 MG: 2 INJECTION INTRAMUSCULAR; INTRAVENOUS at 21:42

## 2024-03-25 RX ADMIN — POTASSIUM CHLORIDE 40 MEQ: 1500 TABLET, EXTENDED RELEASE ORAL at 23:16

## 2024-03-25 RX ADMIN — FAMOTIDINE 20 MG: 10 INJECTION INTRAVENOUS at 21:42

## 2024-03-25 RX ADMIN — SODIUM CHLORIDE 1000 ML: 0.9 INJECTION, SOLUTION INTRAVENOUS at 23:19

## 2024-03-25 RX ADMIN — SODIUM CHLORIDE 1000 ML: 0.9 INJECTION, SOLUTION INTRAVENOUS at 21:41

## 2024-03-25 NOTE — Clinical Note
Cassie Sargent was seen and treated in our emergency department on 3/25/2024.                Diagnosis:     Cassie  .    She may return on this date: 03/27/2024         If you have any questions or concerns, please don't hesitate to call.      Carl Ball PA-C    ______________________________           _______________          _______________  Hospital Representative                              Date                                Time

## 2024-03-26 NOTE — ED PROVIDER NOTES
"History  Chief Complaint   Patient presents with    Vomiting     Reports 1 hour after eating a pre made salad started vomiting, reports vomiting x 5, + mid abdominal pain.      Patient is a 34-year-old male with a history of GERD no significant past surgical history that presents to the emergency department with nausea vomiting diarrhea symptoms for approximately 6 hours.  Patient denies associated symptoms.  Patient states that she went to local grocery store; [employer] and had consumed an entire medium sized salad with nausea vomiting and diarrhea symptoms occurring couple hours status post meal ingestion.  Patient states that \"I am unable to get anything down.  In the beginning anything from the store again.\"  Patient denies recent antibiotic use.  Patient denies any pain.  Patient denies new medications.  Patient denies palliative factors with provocative factors of food smell.  Patient has not effective treatment.  Patient denies fevers, chills, constipation and urinary symptoms.  Patient denies bloody and bilious vomitus.  Patient denies bloody diarrhea.  Patient denies chest pain, shortness of breath, and abdominal pain.      History provided by:  Patient   used: No    Vomiting  Associated symptoms: diarrhea    Associated symptoms: no abdominal pain, no chills, no cough, no fever, no headaches and no sore throat        Prior to Admission Medications   Prescriptions Last Dose Informant Patient Reported? Taking?   amoxicillin-clavulanate (AUGMENTIN) 875-125 mg per tablet   No No   Sig: Take 1 tablet by mouth every 12 (twelve) hours for 7 days   ibuprofen (MOTRIN) 600 mg tablet   No No   Sig: Take 1 tablet (600 mg total) by mouth every 6 (six) hours as needed for mild pain   ondansetron (ZOFRAN ODT) 4 mg disintegrating tablet   No No   Sig: Take 2 tablets by mouth every 8 (eight) hours as needed for nausea or vomiting for up to 10 doses   ondansetron (Zofran ODT) 4 mg disintegrating tablet   No " No   Sig: Take 1 tablet (4 mg total) by mouth every 6 (six) hours as needed for nausea or vomiting   ondansetron (Zofran) 4 mg tablet   No No   Sig: Take 1 tablet (4 mg total) by mouth every 8 (eight) hours as needed for nausea or vomiting for up to 7 days   tobramycin (TOBREX) 0.3 % SOLN   No No   Sig: Administer 2 drops to the right eye 3 (three) times a day      Facility-Administered Medications: None       Past Medical History:   Diagnosis Date    GERD (gastroesophageal reflux disease)        Past Surgical History:   Procedure Laterality Date    TOOTH EXTRACTION         History reviewed. No pertinent family history.  I have reviewed and agree with the history as documented.    E-Cigarette/Vaping    E-Cigarette Use Never User      E-Cigarette/Vaping Substances    Nicotine No     THC Yes     CBD No      Social History     Tobacco Use    Smoking status: Every Day     Current packs/day: 0.25     Types: Cigarettes    Smokeless tobacco: Current   Vaping Use    Vaping status: Never Used   Substance Use Topics    Alcohol use: No    Drug use: Yes     Types: Marijuana     Comment: has medical card       Review of Systems   Constitutional:  Negative for activity change, appetite change, chills and fever.   HENT:  Negative for congestion, postnasal drip, rhinorrhea, sinus pressure, sinus pain, sore throat and tinnitus.    Eyes:  Negative for photophobia and visual disturbance.   Respiratory:  Negative for cough, chest tightness and shortness of breath.    Cardiovascular:  Negative for chest pain and palpitations.   Gastrointestinal:  Positive for diarrhea, nausea and vomiting. Negative for abdominal pain and constipation.   Genitourinary:  Negative for difficulty urinating, dysuria, flank pain, frequency and urgency.   Musculoskeletal:  Negative for back pain, gait problem, neck pain and neck stiffness.   Skin:  Negative for pallor and rash.   Allergic/Immunologic: Negative for environmental allergies and food allergies.    Neurological:  Negative for dizziness, weakness, numbness and headaches.   Psychiatric/Behavioral:  Negative for confusion.    All other systems reviewed and are negative.      Physical Exam  Physical Exam  Vitals and nursing note reviewed.   Constitutional:       General: She is awake. She is not in acute distress.     Appearance: Normal appearance. She is well-developed and normal weight. She is not ill-appearing, toxic-appearing or diaphoretic.      Comments: /64 (BP Location: Right arm)   Pulse 86   Temp 98.2 °F (36.8 °C) (Oral)   Resp 18   Wt 76.8 kg (169 lb 5 oz)   LMP 03/01/2024 (Approximate)   SpO2 98%   BMI 31.99 kg/m²      HENT:      Head: Normocephalic and atraumatic.      Jaw: There is normal jaw occlusion.      Right Ear: Hearing, tympanic membrane and external ear normal. No decreased hearing noted. No drainage, swelling or tenderness. No mastoid tenderness.      Left Ear: Hearing, tympanic membrane and external ear normal. No decreased hearing noted. No drainage, swelling or tenderness. No mastoid tenderness.      Nose: Nose normal.      Mouth/Throat:      Lips: Pink.      Mouth: Mucous membranes are moist.      Pharynx: Oropharynx is clear. Uvula midline.   Eyes:      General: Lids are normal. Vision grossly intact. Gaze aligned appropriately.         Right eye: No discharge.         Left eye: No discharge.      Extraocular Movements: Extraocular movements intact.      Conjunctiva/sclera: Conjunctivae normal.      Pupils: Pupils are equal, round, and reactive to light.   Neck:      Vascular: No JVD.      Trachea: Trachea and phonation normal. No tracheal tenderness or tracheal deviation.   Cardiovascular:      Rate and Rhythm: Normal rate and regular rhythm.      Pulses: Normal pulses.           Radial pulses are 2+ on the right side and 2+ on the left side.        Posterior tibial pulses are 2+ on the right side and 2+ on the left side.      Heart sounds: Normal heart sounds. No  murmur heard.  Pulmonary:      Effort: Pulmonary effort is normal. No respiratory distress.      Breath sounds: Normal breath sounds and air entry. No stridor. No decreased breath sounds, wheezing, rhonchi or rales.   Chest:      Chest wall: No tenderness.   Abdominal:      General: Abdomen is flat. Bowel sounds are normal. There is no distension.      Palpations: Abdomen is soft. Abdomen is not rigid.      Tenderness: There is no abdominal tenderness. There is no guarding or rebound.   Musculoskeletal:         General: No swelling. Normal range of motion.      Cervical back: Full passive range of motion without pain, normal range of motion and neck supple. No rigidity. No spinous process tenderness or muscular tenderness. Normal range of motion.   Feet:      Right foot:      Toenail Condition: Right toenails are normal.      Left foot:      Toenail Condition: Left toenails are normal.   Lymphadenopathy:      Head:      Right side of head: No submental, submandibular, tonsillar, preauricular, posterior auricular or occipital adenopathy.      Left side of head: No submental, submandibular, tonsillar, preauricular, posterior auricular or occipital adenopathy.      Cervical: No cervical adenopathy.      Right cervical: No superficial, deep or posterior cervical adenopathy.     Left cervical: No superficial, deep or posterior cervical adenopathy.   Skin:     General: Skin is warm and dry.      Capillary Refill: Capillary refill takes less than 2 seconds.      Findings: No rash.   Neurological:      General: No focal deficit present.      Mental Status: She is alert and oriented to person, place, and time. Mental status is at baseline.      GCS: GCS eye subscore is 4. GCS verbal subscore is 5. GCS motor subscore is 6.      Sensory: No sensory deficit.   Psychiatric:         Attention and Perception: Attention normal.         Mood and Affect: Mood normal.         Speech: Speech normal.         Behavior: Behavior normal.  Behavior is cooperative.         Thought Content: Thought content normal.         Judgment: Judgment normal.         Vital Signs  ED Triage Vitals [03/25/24 2042]   Temperature Pulse Respirations Blood Pressure SpO2   98.2 °F (36.8 °C) 86 18 125/64 98 %      Temp Source Heart Rate Source Patient Position - Orthostatic VS BP Location FiO2 (%)   Oral Monitor Lying Right arm --      Pain Score       --           Vitals:    03/25/24 2042   BP: 125/64   Pulse: 86   Patient Position - Orthostatic VS: Lying         Visual Acuity      ED Medications  Medications   ondansetron (ZOFRAN) injection 4 mg (4 mg Intravenous Given 3/25/24 2142)   Famotidine (PF) (PEPCID) injection 20 mg (20 mg Intravenous Given 3/25/24 2142)   sodium chloride 0.9 % bolus 1,000 mL (0 mL Intravenous Stopped 3/26/24 0014)   sodium chloride 0.9 % bolus 1,000 mL (0 mL Intravenous Stopped 3/26/24 0014)   potassium chloride (Klor-Con M20) CR tablet 40 mEq (40 mEq Oral Given 3/25/24 2316)       Diagnostic Studies  Results Reviewed       Procedure Component Value Units Date/Time    RBC Morphology Reflex Test [[id number]] Collected: 03/25/24 2142    Lab Status: Final result Specimen: Blood from Arm, Right Updated: 03/26/24 0001    CBC and differential [[id number]]  (Abnormal) Collected: 03/25/24 2142    Lab Status: Final result Specimen: Blood from Arm, Right Updated: 03/25/24 2348     WBC 13.21 Thousand/uL      RBC 5.01 Million/uL      Hemoglobin 14.7 g/dL      Hematocrit 44.5 %      MCV 89 fL      MCH 29.3 pg      MCHC 33.0 g/dL      RDW 12.6 %      MPV 9.5 fL      Platelets 292 Thousands/uL     Narrative:      This is an appended report.  These results have been appended to a previously verified report.    Manual Differential(PHLEBS Do Not Order) [[id number]]  (Abnormal) Collected: 03/25/24 2142    Lab Status: Final result Specimen: Blood from Arm, Right Updated: 03/25/24 2348     Segmented % 84 %      Bands % 6 %      Lymphocytes % 8 %      Monocytes % 0 %       Eosinophils % 2 %      Basophils % 0 %      Absolute Neutrophils 11.89 Thousand/uL      Absolute Lymphocytes 1.06 Thousand/uL      Absolute Monocytes 0.00 Thousand/uL      Absolute Eosinophils 0.26 Thousand/uL      Absolute Basophils 0.00 Thousand/uL      Total Counted --     RBC Morphology Normal     Platelet Estimate Adequate    Comprehensive metabolic panel [[id number]]  (Abnormal) Collected: 03/25/24 2142    Lab Status: Final result Specimen: Blood from Arm, Right Updated: 03/25/24 2210     Sodium 136 mmol/L      Potassium 3.4 mmol/L      Chloride 102 mmol/L      CO2 24 mmol/L      ANION GAP 10 mmol/L      BUN 13 mg/dL      Creatinine 0.85 mg/dL      Glucose 94 mg/dL      Calcium 9.6 mg/dL      AST 17 U/L      ALT 15 U/L      Alkaline Phosphatase 52 U/L      Total Protein 7.8 g/dL      Albumin 5.0 g/dL      Total Bilirubin 0.67 mg/dL      eGFR 88 ml/min/1.73sq m     Narrative:      National Kidney Disease Foundation guidelines for Chronic Kidney Disease (CKD):     Stage 1 with normal or high GFR (GFR > 90 mL/min/1.73 square meters)    Stage 2 Mild CKD (GFR = 60-89 mL/min/1.73 square meters)    Stage 3A Moderate CKD (GFR = 45-59 mL/min/1.73 square meters)    Stage 3B Moderate CKD (GFR = 30-44 mL/min/1.73 square meters)    Stage 4 Severe CKD (GFR = 15-29 mL/min/1.73 square meters)    Stage 5 End Stage CKD (GFR <15 mL/min/1.73 square meters)  Note: GFR calculation is accurate only with a steady state creatinine    Lipase [[id number]]  (Normal) Collected: 03/25/24 2142    Lab Status: Final result Specimen: Blood from Arm, Right Updated: 03/25/24 2210     Lipase 28 u/L                    No orders to display              Procedures  Procedures         ED Course  ED Course as of 03/26/24 0601   Mon Mar 25, 2024   2349 Po challenge successfully negotiated via water and kdur.  Pt suitable for d/c at this time.  Suspect gastroenteritis                                             Medical Decision Making  Patient is a  34-year-old male with a history of GERD no significant past surgical history that presents to the emergency department with nausea vomiting diarrhea symptoms for approximately 6 hours.    Patient hemodynamically stable and afebrile  No sirs  Hypo-kalemia with potassium 3.4, repleted;  Normal kidney function, no anion gap  Leukocytosis 13.21 likely reactive secondary to gastroenteritis symptomatology, nausea vomiting diarrhea  Normal lipase, doubt acute pancreatitis    Delivered 2 L normal saline solution Pepcid and Zofran in the emergency department; patient demonstrates decrease in presenting nausea vomiting ED symptomatology status post medication delivery    Will treat for viral gastroenteritis  The child successfully negotiated in the ED with water  Prescribed Zofran and counseled patient medication administration and side effects  Follow-up with PCP  Follow up with emergency department if symptoms persist or exacerbate  Patient demonstrates verbal understanding of all clinical laboratory and imaging findings, discharge instructions, follow-up, and verbally agrees with current treatment plan with teach back    *Due to voice recognition software, sound alike and misspelled words may be contained in the documentation*      Amount and/or Complexity of Data Reviewed  Labs: ordered. Decision-making details documented in ED Course.    Risk  Prescription drug management.             Disposition  Final diagnoses:   Viral gastroenteritis     Time reflects when diagnosis was documented in both MDM as applicable and the Disposition within this note       Time User Action Codes Description Comment    3/25/2024 11:54 PM Carl Ball Add [A08.4] Viral gastroenteritis           ED Disposition       ED Disposition   Discharge    Condition   Stable    Date/Time   Mon Mar 25, 2024 11:54 PM    Comment   Cassie Sargent discharge to home/self care.                   Follow-up Information       Follow up With Specialties Details  Why Contact Info Additional Information    [facility]   [address] [facility] [id number] [phone] [facility], [address] [place] [address], [id number] [phone]    [facility] Emergency Medicine   [address] [facility] [address]  [phone] [facility], [address] [place] [place], [place], [address]            Discharge Medication List as of 3/25/2024 11:55 PM        CONTINUE these medications which have CHANGED    Details   ondansetron (ZOFRAN) 4 mg tablet Take 1 tablet (4 mg total) by mouth every 8 (eight) hours as needed for nausea or vomiting for up to 3 days, Starting Mon 3/25/2024, Until Thu 3/28/2024 at 2359, Normal           CONTINUE these medications which have NOT CHANGED    Details   amoxicillin-clavulanate (AUGMENTIN) 875-125 mg per tablet Take 1 tablet by mouth every 12 (twelve) hours for 7 days, Starting Thu 3/21/2024, Until Thu 3/28/2024, Normal      ibuprofen (MOTRIN) 600 mg tablet Take 1 tablet (600 mg total) by mouth every 6 (six) hours as needed for mild pain, Starting Tue 11/6/2018, Print      !! ondansetron (ZOFRAN ODT) 4 mg disintegrating tablet Take 2 tablets by mouth every 8 (eight) hours as needed for nausea or vomiting for up to 10 doses, Starting 3/31/2017, Until Discontinued, Print      !! ondansetron (Zofran ODT) 4 mg disintegrating tablet Take 1 tablet (4 mg total) by mouth every 6 (six) hours as needed for nausea or vomiting, Starting Wed 3/30/2022, Normal      tobramycin (TOBREX) 0.3 % SOLN Administer 2 drops to the right eye 3 (three) times a day, Starting Thu 10/26/2023, Normal       !! - Potential duplicate medications found. Please discuss with provider.          No discharge procedures on file.    PDMP Review         Value Time User    PDMP Reviewed  Yes 12/8/2022  6:40 AM Javier CASPER  MD Amira            ED Provider  Electronically Signed by             Carl Ball PA-C  03/26/24 0602

## 2024-03-26 NOTE — PROGRESS NOTES
"    HPI:  Shoulder Pain   The pain is present in the neck and right shoulder. This is a chronic problem. The current episode started more than 1 year ago. There has been no history of extremity trauma. The problem occurs intermittently. The problem has been waxing and waning. The quality of the pain is described as aching and burning. The pain is at a severity of 7/10. The pain is moderate. Associated symptoms include a limited range of motion and stiffness. The symptoms are aggravated by activity. She has tried OTC pain meds and rest for the symptoms. The treatment provided mild relief.   Neck Pain   This is a new problem. The current episode started 1 to 4 weeks ago. The problem occurs intermittently. The problem has been waxing and waning. The pain is associated with nothing. The pain is present in the midline, right side and left side. The quality of the pain is described as aching. The pain is at a severity of 4/10. The pain is mild. The symptoms are aggravated by position and twisting. She has tried heat and home exercises for the symptoms. The treatment provided mild relief.     Cassie Sargent is a 34 y.o. female who presents for evaluation and possible treatment today of chronic neck upper back and right shoulder blade pain.  She reports actually her right shoulder has been most chronic causes her good deal discomfort and \"clicks a lot\".  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  This pain has been going on for years.  She reports neck pain most recently over the past few weeks increasing on the right-hand side this radiates into the upper back.  She denies any radicular complaints into the upper extremity.    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  She reports considerable amount of stress and strain on " her right shoulder girdle but most recently has been having some neck pain when twisting and turning her head.    Denies any associated symptomatology does get occasional headaches.  Reports no recent fever chills night sweats infection or pain upon recumbency.      Chief Complaint   Patient presents with   • Shoulder Pain     Right shoulder clicks a lot, states its in pain. Constant 7, going on for years. Pt is a  and uses it a lot. Self referral    • Neck Pain     About 4 right side going on for a few weeks radiates to he upper back      Past Medical History:   Diagnosis Date   • GERD (gastroesophageal reflux disease)       Past Surgical History:   Procedure Laterality Date   • TOOTH EXTRACTION       History reviewed. No pertinent family history.  Social History     Socioeconomic History   • Marital status: Single     Spouse name: None   • Number of children: None   • Years of education: None   • Highest education level: None   Occupational History   • None   Tobacco Use   • Smoking status: Every Day     Current packs/day: 0.25     Types: Cigarettes   • Smokeless tobacco: Current   Vaping Use   • Vaping status: Never Used   Substance and Sexual Activity   • Alcohol use: No   • Drug use: Yes     Types: Marijuana     Comment: has medical card   • Sexual activity: None   Other Topics Concern   • None   Social History Narrative   • None     Social Determinants of Health     Financial Resource Strain: Not on file   Food Insecurity: Not on file   Transportation Needs: Not on file   Physical Activity: Not on file   Stress: Not on file   Social Connections: Not on file   Intimate Partner Violence: Not on file   Housing Stability: Not on file       Current Outpatient Medications:   •  amoxicillin-clavulanate (AUGMENTIN) 875-125 mg per tablet, Take 1 tablet by mouth every 12 (twelve) hours for 7 days, Disp: 14 tablet, Rfl: 0  •  ibuprofen (MOTRIN) 600 mg tablet, Take 1 tablet (600 mg total) by mouth every 6 (six)  hours as needed for mild pain, Disp: 20 tablet, Rfl: 0  •  ondansetron (ZOFRAN ODT) 4 mg disintegrating tablet, Take 2 tablets by mouth every 8 (eight) hours as needed for nausea or vomiting for up to 10 doses, Disp: 10 tablet, Rfl: 0  •  ondansetron (Zofran ODT) 4 mg disintegrating tablet, Take 1 tablet (4 mg total) by mouth every 6 (six) hours as needed for nausea or vomiting, Disp: 20 tablet, Rfl: 0  •  ondansetron (ZOFRAN) 4 mg tablet, Take 1 tablet (4 mg total) by mouth every 8 (eight) hours as needed for nausea or vomiting for up to 3 days, Disp: 9 tablet, Rfl: 0  •  tobramycin (TOBREX) 0.3 % SOLN, Administer 2 drops to the right eye 3 (three) times a day, Disp: 5 mL, Rfl: 0  No current facility-administered medications for this visit.  Allergies as of 03/18/2024   • (No Known Allergies)         The following portions of the patient's history were reviewed and updated as appropriate: allergies, current medications, past family history, past medical history, past social history, past surgical history, and problem list.    Review of Systems   Constitutional: Negative.    Musculoskeletal:  Positive for back pain, neck pain, neck stiffness and stiffness.   Neurological: Negative.    Psychiatric/Behavioral: Negative.         Physical Exam:  Physical Exam  Vitals reviewed.   Constitutional:       Appearance: Normal appearance.   Cardiovascular:      Rate and Rhythm: Normal rate.      Pulses: Normal pulses.   Pulmonary:      Effort: Pulmonary effort is normal.   Musculoskeletal:      Right shoulder: Crepitus present. Decreased range of motion.        Arms:       Cervical back: Spasms and tenderness present. Pain with movement present. Decreased range of motion.        Back:    Skin:     General: Skin is warm and dry.   Neurological:      General: No focal deficit present.      Mental Status: She is alert and oriented to person, place, and time.      Sensory: Sensation is intact.      Motor: Motor function is intact.       Deep Tendon Reflexes: Reflexes are normal and symmetric.   Psychiatric:         Mood and Affect: Mood normal.         Behavior: Behavior normal.         Thought Content: Thought content normal.         Assessment:  Diagnoses and all orders for this visit:    Neck pain    Myofascial pain    Chronic right shoulder pain         Treatment:  00599  Manipulation to the T4 C5 level producing good joint release well-tolerated by the patient today.    Discussion:  I reviewed past medical notes.  Discussed case in detail with the patient today.  Trial conservative treatment goals of increasing overall functional status cervical thoracic spines increasing range of motion and stability of the right shoulder girdle reassessment in 4 weeks.  No follow-ups on file.

## 2024-05-02 ENCOUNTER — TELEPHONE (OUTPATIENT)
Age: 34
End: 2024-05-02

## 2024-05-02 NOTE — TELEPHONE ENCOUNTER
Entered patients chart to verify PCP from ambulatory referral to [facility]

## 2024-08-07 ENCOUNTER — PROCEDURE VISIT (OUTPATIENT)
Age: 34
End: 2024-08-07
Payer: COMMERCIAL

## 2024-08-07 VITALS
SYSTOLIC BLOOD PRESSURE: 134 MMHG | DIASTOLIC BLOOD PRESSURE: 83 MMHG | BODY MASS INDEX: 29.45 KG/M2 | HEART RATE: 86 BPM | HEIGHT: 61 IN | WEIGHT: 156 LBS

## 2024-08-07 DIAGNOSIS — M79.18 MYOFASCIAL PAIN: ICD-10-CM

## 2024-08-07 DIAGNOSIS — M54.2 NECK PAIN: Primary | ICD-10-CM

## 2024-08-07 PROCEDURE — 98940 CHIROPRACT MANJ 1-2 REGIONS: CPT | Performed by: CHIROPRACTOR

## 2024-08-07 NOTE — PROGRESS NOTES
HPI:  Brea presents for treatment today of neck and upper back pain.  She was last seen initially in March of this year.  She is a  and has significant postural constraints placed upon her especially in the neck bilateral shoulders and upper back.  She reports that she has been quite busy but most recently has had persistent neck and upper back pain actually into the left shoulder region.      The following portions of the patient's history were reviewed and updated as appropriate: allergies, current medications, past family history, past medical history, past social history, past surgical history, and problem list.    Review of Systems    Physical Exam:  Exam today reveals left superior trapezius, rhomboid, and levator scapula spasm.  She has reduced cervical range of motion on extension and left lateral bending and right rotation biomechanically joint dysfunction C5-C6 C1-C2 as well as T4-T5 remaining exam is stable.    Assessment:   Diagnosis ICD-10-CM Associated Orders   1. Neck pain  M54.2       2. Myofascial pain  M79.18                 Treatment: 57383  Manipulation T4 C5 C1 producing a joint release well-tolerated by the patient she reported immediate relief of symptomatology.    Discussion:  Continue daily stretching see her back for follow-up.

## 2024-08-26 ENCOUNTER — PROCEDURE VISIT (OUTPATIENT)
Age: 34
End: 2024-08-26
Payer: COMMERCIAL

## 2024-08-26 VITALS
HEART RATE: 82 BPM | SYSTOLIC BLOOD PRESSURE: 130 MMHG | BODY MASS INDEX: 29.45 KG/M2 | WEIGHT: 156 LBS | HEIGHT: 61 IN | DIASTOLIC BLOOD PRESSURE: 82 MMHG

## 2024-08-26 DIAGNOSIS — M25.511 CHRONIC RIGHT SHOULDER PAIN: ICD-10-CM

## 2024-08-26 DIAGNOSIS — G89.29 CHRONIC RIGHT SHOULDER PAIN: ICD-10-CM

## 2024-08-26 DIAGNOSIS — M79.18 MYOFASCIAL PAIN: ICD-10-CM

## 2024-08-26 DIAGNOSIS — M54.2 NECK PAIN: Primary | ICD-10-CM

## 2024-08-26 PROCEDURE — 98940 CHIROPRACT MANJ 1-2 REGIONS: CPT | Performed by: CHIROPRACTOR

## 2024-08-26 NOTE — PROGRESS NOTES
HPI:  Brea presents for treatment today of neck and upper back pain.  Reports feeling better 4-5 on a pain scale today.    The following portions of the patient's history were reviewed and updated as appropriate: allergies, current medications, past family history, past medical history, past social history, past surgical history, and problem list.    Review of Systems    Physical Exam:  Exam today reveals left superior trapezius, rhomboid, and levator scapula spasm.  She has reduced cervical range of motion on extension and left lateral bending and right rotation biomechanically joint dysfunction C5-C6 C1-C2 as well as T4-T5 remaining exam is stable.    Assessment:   Diagnosis ICD-10-CM Associated Orders   1. Neck pain  M54.2       2. Myofascial pain  M79.18       3. Chronic right shoulder pain  M25.511     G89.29                 Treatment: 16417  Manipulation T4 C5 C1 producing a joint release well-tolerated by the patient she reported immediate relief of symptomatology.    Discussion:  Continue daily stretching see her back for follow-up.

## 2024-09-11 ENCOUNTER — PROCEDURE VISIT (OUTPATIENT)
Age: 34
End: 2024-09-11
Payer: COMMERCIAL

## 2024-09-11 VITALS
BODY MASS INDEX: 29.45 KG/M2 | HEIGHT: 61 IN | HEART RATE: 75 BPM | DIASTOLIC BLOOD PRESSURE: 74 MMHG | WEIGHT: 156 LBS | SYSTOLIC BLOOD PRESSURE: 111 MMHG

## 2024-09-11 DIAGNOSIS — M54.59 MECHANICAL LOW BACK PAIN: ICD-10-CM

## 2024-09-11 DIAGNOSIS — M25.511 CHRONIC RIGHT SHOULDER PAIN: ICD-10-CM

## 2024-09-11 DIAGNOSIS — M79.18 MYOFASCIAL PAIN: ICD-10-CM

## 2024-09-11 DIAGNOSIS — G89.29 CHRONIC RIGHT SHOULDER PAIN: ICD-10-CM

## 2024-09-11 DIAGNOSIS — M54.2 NECK PAIN: Primary | ICD-10-CM

## 2024-09-11 PROCEDURE — 98942 CHIROPRACTIC MANJ 5 REGIONS: CPT | Performed by: CHIROPRACTOR

## 2024-09-13 NOTE — PROGRESS NOTES
HPI:  Brea presents for treatment today of neck and upper back pain.  Reports feeling better 2-5 on a pain scale today.    The following portions of the patient's history were reviewed and updated as appropriate: allergies, current medications, past family history, past medical history, past social history, past surgical history, and problem list.    Review of Systems    Physical Exam:  Exam today reveals left superior trapezius, rhomboid, and levator scapula spasm.  She has reduced cervical range of motion on extension and left lateral bending and right rotation biomechanically joint dysfunction C5-C6 C1-C2 as well as T4-T5 remaining exam is stable.    Pelvic obliquity elevated right versus left innominate ligament quality biomechanical joint dysfunction present of the right sacroiliac joint involvement at L5-S1 L4-L5 and L3-L4 motion units    Assessment:   Diagnosis ICD-10-CM Associated Orders   1. Neck pain  M54.2       2. Myofascial pain  M79.18       3. Chronic right shoulder pain  M25.511     G89.29       4. Mechanical low back pain  M54.59                 Treatment: 22141  Manipulation T4 C5 C1 producing a joint release well-tolerated by the patient she reported immediate relief of symptomatology.  Manipulation to the right innominate, sacrum, L5 L4 L3 levels via side posture manipulation well-tolerated.    Discussion:  Continue daily stretching see her back for follow-up.

## 2025-04-03 ENCOUNTER — TELEPHONE (OUTPATIENT)
Age: 35
End: 2025-04-03

## 2025-04-03 NOTE — TELEPHONE ENCOUNTER
Called patient to reschedule appt for today due to her insurance is not active per patient . Left message to call back to make appt when her insurance is active.

## 2025-05-26 ENCOUNTER — HOSPITAL ENCOUNTER (EMERGENCY)
Facility: HOSPITAL | Age: 35
Discharge: HOME | End: 2025-05-26
Attending: EMERGENCY MEDICINE | Admitting: EMERGENCY MEDICINE
Payer: COMMERCIAL

## 2025-05-26 VITALS
HEART RATE: 90 BPM | DIASTOLIC BLOOD PRESSURE: 77 MMHG | WEIGHT: 147.71 LBS | TEMPERATURE: 97.6 F | RESPIRATION RATE: 18 BRPM | OXYGEN SATURATION: 99 % | SYSTOLIC BLOOD PRESSURE: 142 MMHG | BODY MASS INDEX: 27.91 KG/M2

## 2025-05-26 DIAGNOSIS — F11.20 ENCOUNTER FOR METHADONE MAINTENANCE THERAPY (HCC): Primary | ICD-10-CM

## 2025-05-26 PROCEDURE — 99284 EMERGENCY DEPT VISIT MOD MDM: CPT | Performed by: EMERGENCY MEDICINE

## 2025-05-26 PROCEDURE — 99282 EMERGENCY DEPT VISIT SF MDM: CPT

## 2025-05-26 RX ORDER — METHADONE HYDROCHLORIDE 10 MG/1
20 TABLET ORAL ONCE
Refills: 0 | Status: COMPLETED | OUTPATIENT
Start: 2025-05-26 | End: 2025-05-26

## 2025-05-26 RX ADMIN — METHADONE HYDROCHLORIDE 20 MG: 10 TABLET ORAL at 07:45

## 2025-05-26 NOTE — ED ATTENDING ATTESTATION
5/26/2025  I, Laci Orlando MD, saw and evaluated the patient. I have discussed the patient with the resident/non-physician practitioner and agree with the resident's/non-physician practitioner's findings, Plan of Care, and MDM as documented in the resident's/non-physician practitioner's note, except where noted. All available labs and Radiology studies were reviewed.  I was present for key portions of any procedure(s) performed by the resident/non-physician practitioner and I was immediately available to provide assistance.       At this point I agree with the current assessment done in the Emergency Department.  I have conducted an independent evaluation of this patient a history and physical is as follows:  Briefly, 35-year-old female presenting with complaint of opiate withdrawal.  Patient states she has been clean on medication assisted therapy with methadone for 8 years, had last dose Saturday, had forgotten it was a holiday and was unable to get her dose today.  She states she is starting to feel somewhat shaky but otherwise has no other symptoms.  On examination, in no acute distress, normal respiratory effort, cranial nerves grossly intact, pupil exam normal with pupils 3 mm, no diaphoresis.  Patient provides methadone dosing bottle with dose of 20 mg in name of her doctor, this was transmitted to pharmacy, that dose ordered and given to patient.  Discharged with strict return precautions, follow-up primary care doctor as well as methadone clinic.  ED Course         Critical Care Time  Procedures

## 2025-05-26 NOTE — ED PROVIDER NOTES
Time reflects when diagnosis was documented in both MDM as applicable and the Disposition within this note       Time User Action Codes Description Comment    5/26/2025  7:48 AM Jennifer Bishop Add [F11.20] Encounter for methadone maintenance therapy (HCC)           ED Disposition       ED Disposition   Discharge    Condition   Stable    Date/Time   Mon May 26, 2025  7:44 AM    Comment   Cassie Sargent discharge to home/self care.                   Assessment & Plan       Medical Decision Making  Cassie aSrgent is a 35 y.o. female presenting with medication administration in setting of closed methadone clinic for holiday. Shaky, slightly nauseous and slightly loose stools. Otherwise no symptoms of withdrawal. Vitals unremarkable. Exam unremarkable.      DDx including but not limited to: necessary methadone administration, opioid withdrawal, request for detox. Doubt substance abuse, metabolic abnormality, psychiatric disorder.     See ED course for further updates and interpretation of results.    Based on these results and H&P, patient presenting on memorial day for methadone dose as clinic she typically goes to - [facility] - is closed for Memorial day. Given this information, the patient presenting with bottle with her name, prescription dose, [facility] as treatment location, and recent prescription, she will be given a dose of her methadone. Provided, patient reporting improvement of symptoms. She is stable for discharge.    Results, clinical impressions, and plan were discussed with patient. They expressed understanding and were in agreement with plan. Patient was given the opportunity to ask questions in ED. All questions and concerns were addressed in ED.    After evaluation and workup in the emergency department Patient appears well, is nontoxic appearing, expresses understanding and agrees with plan of care at this time.  In light of this patient would benefit from outpatient management.  Discussed strict return precautions.  Discussed importance of following up with PCP in the next few days.  All questions answered.  Patient is agreeable to discharge.    Risk  Prescription drug management.        ED Course as of 05/26/25 2250   Mon May 26, 2025   0711 Not yet triaged   0725 Patient presenting with prescription bottle:  Dr. Galdamez  20mg Methadone   0733 Attempted to call [facility] methadone clinic. No answer. Of note, today is Memorial day (holiday)       Medications   methadone (Dolophine) tablet 20 mg (20 mg Oral Given 5/26/25 0745)       ED Risk Strat Scores                    No data recorded        SBIRT 20yo+      Flowsheet Row Most Recent Value   Initial Alcohol Screen: US AUDIT-C     1. How often do you have a drink containing alcohol? 0 Filed at: 05/26/2025 0737   2. How many drinks containing alcohol do you have on a typical day you are drinking?  0 Filed at: 05/26/2025 0737   3a. Male UNDER 65: How often do you have five or more drinks on one occasion? 0 Filed at: 05/26/2025 0737   3b. FEMALE Any Age, or MALE 65+: How often do you have 4 or more drinks on one occassion? 0 Filed at: 05/26/2025 0737   Audit-C Score 0 Filed at: 05/26/2025 0737   JEAN CLAUDE: How many times in the past year have you...    Used an illegal drug or used a prescription medication for non-medical reasons? Never  [ON Methadone] Filed at: 05/26/2025 0737                            History of Present Illness       Chief Complaint   Patient presents with    Medication Problem     Pt reports been sober for 8 years and has been on methadone. Pt unable to get todays dose and is here requesting dose. Pt does not believe she needs tx or does not have thoughts of using again.        Past Medical History[1]   Past Surgical History[2]   Family History[3]   Social History[4]   E-Cigarette/Vaping    E-Cigarette Use Never User       E-Cigarette/Vaping Substances    Nicotine No     THC Yes     CBD No       I have reviewed and  agree with the history as documented.     Cassie Sargent is a 35 y.o. 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    Patient reports she has been on methadone for 8 years, typically goes to [facility] for her methadone doses, however, it is closed today for the holiday. Requesting a single dose of her methadone as she feels she is going into withdrawal. Last dose Saturday. Reports some shakiness, chills, mild nausea, loose stools consistent with the beginning of previous withdrawal symptoms. No other complaints at this time.              Review of Systems   Constitutional:  Positive for chills. Negative for fever.   HENT:  Negative for congestion, hearing loss and trouble swallowing.    Eyes:  Negative for pain and visual disturbance.   Respiratory:  Negative for cough and shortness of breath.    Cardiovascular:  Negative for chest pain, palpitations and leg swelling.   Gastrointestinal:  Positive for nausea. Negative for abdominal pain, constipation, diarrhea and vomiting.   Genitourinary:  Negative for dysuria, frequency and hematuria.   Musculoskeletal:  Negative for arthralgias and back pain.   Skin:  Negative for color change and rash.   Neurological:  Positive for headaches. Negative for dizziness, seizures, syncope, weakness and light-headedness.   Psychiatric/Behavioral:  Negative for dysphoric mood.    All other systems reviewed and are negative.          Objective       ED Triage Vitals [05/26/25 0716]   Temperature Pulse Blood Pressure Respirations SpO2 Patient Position - Orthostatic VS   97.6 °F (36.4 °C) 90 142/77 18 99 % Sitting      Temp Source Heart Rate Source BP Location FiO2 (%) Pain Score    Oral Monitor Right arm -- --      Vitals      Date and Time Temp Pulse SpO2 Resp BP Pain Score FACES Pain Rating User   05/26/25 0716 97.6 °F (36.4 °C) 90 99 % 18 142/77 -- -- EB            Physical Exam  Vitals and  nursing note reviewed.   Constitutional:       General: She is not in acute distress.     Appearance: She is well-developed. She is not ill-appearing or diaphoretic.   HENT:      Head: Normocephalic and atraumatic.      Mouth/Throat:      Mouth: Mucous membranes are moist.      Pharynx: Oropharynx is clear.     Eyes:      Extraocular Movements: Extraocular movements intact.      Conjunctiva/sclera: Conjunctivae normal.       Cardiovascular:      Rate and Rhythm: Normal rate and regular rhythm.      Pulses: Normal pulses.      Heart sounds: Normal heart sounds.   Pulmonary:      Effort: Pulmonary effort is normal. No respiratory distress.      Breath sounds: Normal breath sounds. No wheezing, rhonchi or rales.   Chest:      Chest wall: No tenderness.   Abdominal:      General: Abdomen is flat. There is no distension.      Palpations: Abdomen is soft.      Tenderness: There is no abdominal tenderness. There is no right CVA tenderness, left CVA tenderness, guarding or rebound.     Musculoskeletal:      Cervical back: Normal range of motion.     Skin:     General: Skin is warm and dry.      Capillary Refill: Capillary refill takes less than 2 seconds.     Neurological:      General: No focal deficit present.      Mental Status: She is alert and oriented to person, place, and time.      Sensory: No sensory deficit.      Motor: No weakness.     Psychiatric:         Mood and Affect: Mood normal.         Behavior: Behavior normal.         Results Reviewed       None            No orders to display       Procedures    ED Medication and Procedure Management   Prior to Admission Medications   Prescriptions Last Dose Informant Patient Reported? Taking?   ibuprofen (MOTRIN) 600 mg tablet   No No   Sig: Take 1 tablet (600 mg total) by mouth every 6 (six) hours as needed for mild pain   ondansetron (ZOFRAN ODT) 4 mg disintegrating tablet   No No   Sig: Take 2 tablets by mouth every 8 (eight) hours as needed for nausea or vomiting  for up to 10 doses   ondansetron (ZOFRAN) 4 mg tablet   No No   Sig: Take 1 tablet (4 mg total) by mouth every 8 (eight) hours as needed for nausea or vomiting for up to 3 days   ondansetron (Zofran ODT) 4 mg disintegrating tablet   No No   Sig: Take 1 tablet (4 mg total) by mouth every 6 (six) hours as needed for nausea or vomiting   tobramycin (TOBREX) 0.3 % SOLN   No No   Sig: Administer 2 drops to the right eye 3 (three) times a day      Facility-Administered Medications: None     Discharge Medication List as of 5/26/2025  7:48 AM        CONTINUE these medications which have NOT CHANGED    Details   ibuprofen (MOTRIN) 600 mg tablet Take 1 tablet (600 mg total) by mouth every 6 (six) hours as needed for mild pain, Starting Tue 11/6/2018, Print      !! ondansetron (ZOFRAN ODT) 4 mg disintegrating tablet Take 2 tablets by mouth every 8 (eight) hours as needed for nausea or vomiting for up to 10 doses, Starting 3/31/2017, Until Discontinued, Print      !! ondansetron (Zofran ODT) 4 mg disintegrating tablet Take 1 tablet (4 mg total) by mouth every 6 (six) hours as needed for nausea or vomiting, Starting Wed 3/30/2022, Normal      ondansetron (ZOFRAN) 4 mg tablet Take 1 tablet (4 mg total) by mouth every 8 (eight) hours as needed for nausea or vomiting for up to 3 days, Starting Mon 3/25/2024, Until Thu 3/28/2024 at 2359, Normal      tobramycin (TOBREX) 0.3 % SOLN Administer 2 drops to the right eye 3 (three) times a day, Starting Thu 10/26/2023, Normal       !! - Potential duplicate medications found. Please discuss with provider.        No discharge procedures on file.  ED SEPSIS DOCUMENTATION   Time reflects when diagnosis was documented in both MDM as applicable and the Disposition within this note       Time User Action Codes Description Comment    5/26/2025  7:48 AM Jennifer Bishop Add [F11.20] Encounter for methadone maintenance therapy (HCC)                      [1]   Past Medical History:  Diagnosis Date     GERD (gastroesophageal reflux disease)    [2]   Past Surgical History:  Procedure Laterality Date    TOOTH EXTRACTION     [3] No family history on file.  [4]   Social History  Tobacco Use    Smoking status: Every Day     Current packs/day: 0.25     Types: Cigarettes    Smokeless tobacco: Current   Vaping Use    Vaping status: Never Used   Substance Use Topics    Alcohol use: No    Drug use: Yes     Types: Marijuana     Comment: has medical card        Jennifer Bishop MD  05/26/25 8439

## 2025-05-26 NOTE — DISCHARGE INSTRUCTIONS
You were evaluated in the emergency department for: medication issue. You can access your current and pending results through [employer]'s [facility].    - You should follow-up with your primary care provider, as soon as possible, for re-evaluation.    Please, return to the emergency department if you experience new or worsening symptoms, fever, chest pain, shortness of breath, difficulty breathing, dizziness, abdominal pain, persistent nausea/vomiting, syncope or passing out, blood in your urine or stool, coughing up blood, leg swelling/pain, urinary retention, bowel or bladder incontinence, numbness between your legs.

## 2025-06-25 ENCOUNTER — TELEPHONE (OUTPATIENT)
Age: 35
End: 2025-06-25

## 2025-06-25 NOTE — TELEPHONE ENCOUNTER
Patient attempting to schedule chiro appt for this week. Unfortunately, nothing available until next week. Advised patient to call back in the morning for possible cancellation with Dr Martinez